# Patient Record
Sex: FEMALE | Race: WHITE | Employment: OTHER | URBAN - METROPOLITAN AREA
[De-identification: names, ages, dates, MRNs, and addresses within clinical notes are randomized per-mention and may not be internally consistent; named-entity substitution may affect disease eponyms.]

---

## 2017-09-26 ENCOUNTER — OFFICE VISIT (OUTPATIENT)
Dept: OBGYN CLINIC | Age: 22
End: 2017-09-26

## 2017-09-26 VITALS — WEIGHT: 285 LBS | BODY MASS INDEX: 45.31 KG/M2 | DIASTOLIC BLOOD PRESSURE: 86 MMHG | SYSTOLIC BLOOD PRESSURE: 128 MMHG

## 2017-09-26 DIAGNOSIS — Z01.419 ENCOUNTER FOR GYNECOLOGICAL EXAMINATION WITHOUT ABNORMAL FINDING: Primary | ICD-10-CM

## 2017-09-26 NOTE — MR AVS SNAPSHOT
Visit Information Date & Time Provider Department Dept. Phone Encounter #  
 8/27/2265  6:08 PM Abhijit Alcantara MD [address] [facility] [phone] [id number] Upcoming Health Maintenance Date Due  
 HPV AGE 9Y-34Y (1 of 3 - Female 3 Dose Series) 5/4/2005 PAP AKA CERVICAL CYTOLOGY 5/4/2015 INFLUENZA AGE 9 TO ADULT 8/1/2017 Allergies as of 9/26/2017  Review Complete On: 7/1/2013 By: Ayleen Loaiza RN Severity Noted Reaction Type Reactions Bactrim [Sulfamethoprim Ds]  07/01/2013    Rash Current Immunizations  Never Reviewed No immunizations on file. Not reviewed this visit You Were Diagnosed With   
  
 Codes Comments Encounter for gynecological examination without abnormal finding    -  Primary ICD-10-CM: O85.026 ICD-9-CM: V72.31 Vitals OB Status Smoking Status Having regular periods Never Smoker Your Updated Medication List  
  
   
This list is accurate as of: 9/26/17  2:09 PM.  Always use your most recent med list.  
  
  
  
  
 LEXAPRO 10 mg tablet Generic drug:  escitalopram oxalate Take 10 mg by mouth daily. norethindrone-ethinyl estradiol 1-35 mg-mcg Tab Commonly known as:  ORTHO-NOVUM 1-35 TAB, NORTREL 1-35 TAB Take 1 Tab by mouth daily. promethazine 25 mg tablet Commonly known as:  PHENERGAN Take 1 Tab by mouth every six (6) hours as needed for Nausea. Patient Instructions Pelvic Exam: Care Instructions Your Care Instructions When your doctor examines all of your pelvic organs, it's called a pelvic exam. Two good reasons to have this kind of exam are to check for sexually transmitted infections (STIs) and to get a Pap test. A Pap test is also called a Pap smear. It checks for early changes that can lead to cancer of the cervix. Sometimes a pelvic exam is part of a regular checkup.  In this case, you can do some things to make your test results as accurate as possible. · Try to schedule the exam when you don't have your period. · Don't use douches, tampons, or vaginal medicines, sprays, or powders for 24 hours before your exam. 
· Don't have sex for 24 hours before your exam. 
Other times, women have this kind of exam at any time of the month. This is because they have pelvic pain, bleeding, or discharge. Or they may have another pelvic problem. Before your exam, it's important to share some information with your doctor. For example, if you are a survivor of rape or sexual abuse, you can talk about any concerns you may have. Your doctor will also want to know if you are pregnant or use birth control. And he or she will want to hear about any problems, surgeries, or procedures you have had in your pelvic area. You will also need to tell your doctor when your last period was. Follow-up care is a key part of your treatment and safety. Be sure to make and go to all appointments, and call your doctor if you are having problems. It's also a good idea to know your test results and keep a list of the medicines you take. How is a pelvic exam done? · During a pelvic exam, you will: ¨ Take off your clothes below the waist. You will get a paper or cloth cover to put over the lower half of your body. [place] on your back on an exam table. Your feet will be raised above you. Stirrups will support your feet. · The doctor will: ¨ Ask you to relax your knees. Your knees need to lean out, toward the walls. ¨ Check the opening of your vagina for sores or swelling. ¨ Gently put a tool called a speculum into your vagina. It opens the vagina a little bit. You will feel some pressure. But if you are relaxed, it will not hurt. It lets your doctor see inside the vagina. ¨ Use a small brush, spatula, or swab to get a sample of cells, if you are having a Pap test or culture. The doctor then removes the speculum. ¨ Put on gloves and put one or two fingers of one hand into your vagina. The other hand goes on your lower belly. This lets your doctor feel your pelvic organs. You will probably feel some pressure. Try to stay relaxed. ¨ Put one gloved finger into your rectum and one into your vagina, if needed. This can also help check your pelvic organs. This exam takes about 10 minutes. At the end, you will get a washcloth or tissue to clean your vaginal area. It's normal to have some discharge after this exam. You can then get dressed. Some test results may be ready right away. But results from a culture or a Pap test may take several days or a few weeks. Why should you have a pelvic exam? 
· You want to have recommended screening tests. This includes a Pap test. 
· You think you have a vaginal infection. Signs include itching, burning, or unusual discharge. · You might have been exposed to a sexually transmitted infection (STI), such as chlamydia or herpes. · You have vaginal bleeding that is not part of your normal menstrual period. · You have pain in your belly or pelvis. · You have been sexually assaulted. A pelvic exam lets your doctor collect evidence and check for STIs. · You are pregnant. · You are having trouble getting pregnant. What are the risks of a pelvic exam? 
There are no risks from a pelvic exam. 
When should you call for help? Watch closely for changes in your health, and be sure to contact your doctor if: 
· You have heavy bleeding or discharge from your vagina after the exam. 
Where can you learn more? Go to http://emory-hillary.info/. Enter Q514 in the search box to learn more about \"Pelvic Exam: Care Instructions. \" Current as of: October 13, 2016 Content Version: 11.3 © 8443-8220 [employer].  Care instructions adapted under license by [employer] (which disclaims liability or warranty for this information). If you have questions about a medical condition or this instruction, always ask your healthcare professional. [address] any warranty or liability for your use of this information. Introducing [facility] SERVICES! Jolene Hooker introduces [facility] patient portal. Now you can access parts of your medical record, email your doctor's office, and request medication refills online. 1. In your internet browser, go to [email or web address] 2. Click on the First Time User? Click Here link in the Sign In box. You will see the New Member Sign Up page. 3. Enter your [facility] Access Code exactly as it appears below. You will not need to use this code after youve completed the sign-up process. If you do not sign up before the expiration date, you must request a new code. · [facility] Access Code: [email or web address] Expires: 12/25/2017  2:09 PM 
 
4. Enter the last four digits of your Social Security Number (xxxx) and Date of Birth (mm/dd/yyyy) as indicated and click Submit. You will be taken to the next sign-up page. 5. Create a [facility] ID. This will be your [facility] login ID and cannot be changed, so think of one that is secure and easy to remember. 6. Create a [facility] password. You can change your password at any time. 7. Enter your Password Reset Question and Answer. This can be used at a later time if you forget your password. 8. Enter your e-mail address. You will receive e-mail notification when new information is available in [address]. 9. Click Sign Up. You can now view and download portions of your medical record. 10. Click the Download Summary menu link to download a portable copy of your medical information. If you have questions, please visit the Frequently Asked Questions section of the [facility] website. Remember, [facility] is NOT to be used for urgent needs. For medical emergencies, dial 911. Now available from your iPhone and Android! Please provide this summary of care documentation to your next provider. Your primary care clinician is listed as NOT ON FILE. If you have any questions after today's visit, please call [phone].

## 2017-09-26 NOTE — PROGRESS NOTES
Annual exam ages 21-44    Moody Curiel is a No obstetric history on file. ,  20 y.o. female [facility] No LMP recorded. She presents for her annual checkup. Patient would like to discuss going back on birth control. Patient states she has been off birth control for a while and cycles have become more irregular in the last few months. Seen by me years ago due to concerns w ruptured ovarian cyst and irregular cycles  She is having no significant problems. 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 Menstrual status:    Her periods are moderate in flow. She is using one to two pads or tampons per day, irregular cycles. She denies dysmenorrhea. She reports no premenstrual symptoms. Contraception:    The current method of family planning is abstinence. Sexual history:     She  has no sexual activity history on file. .    Medical conditions:    Since her last annual GYN exam about four or more years ago, she has not the following changes in her health history: none. Pap and Mammogram History:    Patient hasn't had a pap smear. Breast Cancer History/Substance Abuse: negative    Past Medical History:   Diagnosis Date    Anxiety     Weight disorder      No past surgical history on file. Current Outpatient Prescriptions   Medication Sig Dispense Refill    escitalopram (LEXAPRO) 10 mg tablet Take 10 mg by mouth daily.  norethindrone-ethinyl estradiol (ORTHO-NOVUM 1-35 TAB, NORTREL 1-35 TAB) 1-35 mg-mcg per tablet Take 1 Tab by mouth daily.  promethazine (PHENERGAN) 25 mg tablet Take 1 Tab by mouth every six (6) hours as needed for Nausea. 20 Tab 0     Allergies: Bactrim [sulfamethoprim ds]     Tobacco History:  reports that she has never smoked.  She does not have any smokeless tobacco history on file. Alcohol Abuse:  reports that she does not drink alcohol. Drug Abuse:  reports that she does not use illicit drugs. Family Medical/Cancer History: No family history on file. Review of Systems - History obtained from the patient  Constitutional: negative for weight loss, fever, night sweats  HEENT: negative for hearing loss, earache, congestion, snoring, sorethroat  CV: negative for chest pain, palpitations, edema  Resp: negative for cough, shortness of breath, wheezing  GI: negative for change in bowel habits, abdominal pain, black or bloody stools  : negative for frequency, dysuria, hematuria, vaginal discharge  MSK: negative for back pain, joint pain, muscle pain  Breast: negative for breast lumps, nipple discharge, galactorrhea  Skin :negative for itching, rash, hives  Neuro: negative for dizziness, headache, confusion, weakness  Psych: negative for anxiety, depression, change in mood  Heme/lymph: negative for bleeding, bruising, pallor    Physical Exam    There were no vitals taken for this visit. Constitutional  · Appearance: well-nourished, well developed, alert, in no acute distress    HENT  · Head and Face: appears normal    Chest  · Respiratory Effort: breathing unlabored    Gastrointestinal  · Abdominal Examination: abdomen non-tender to palpation, normal bowel sounds, no masses present  · Liver and spleen: no hepatomegaly present, spleen not palpable  · Hernias: no hernias identified    Genitourinary  · deferred    Skin  · General Inspection: no rash, no lesions identified    Neurologic/Psychiatric  · Mental Status:  · Orientation: grossly oriented to person, place and time  · Mood and Affect: mood normal, affect appropriate    . Assessment:  Routine gynecologic examination  Her current medical status is satisfactory with no evidence of significant gynecologic issues.   Desires restart of ocp  Declines STD screening  Plan:  Counseled re: diet, exercise, healthy lifestyle; safe sex practices reviewed  Return for yearly wellness visit  Script for ocp shared with appropriate instructions

## 2017-09-26 NOTE — PATIENT INSTRUCTIONS
Pelvic Exam: Care Instructions  Your Care Instructions    When your doctor examines all of your pelvic organs, it's called a pelvic exam. Two good reasons to have this kind of exam are to check for sexually transmitted infections (STIs) and to get a Pap test. A Pap test is also called a Pap smear. It checks for early changes that can lead to cancer of the cervix. Sometimes a pelvic exam is part of a regular checkup. In this case, you can do some things to make your test results as accurate as possible. · Try to schedule the exam when you don't have your period. · Don't use douches, tampons, or vaginal medicines, sprays, or powders for 24 hours before your exam.  · Don't have sex for 24 hours before your exam.  Other times, women have this kind of exam at any time of the month. This is because they have pelvic pain, bleeding, or discharge. Or they may have another pelvic problem. Before your exam, it's important to share some information with your doctor. For example, if you are a survivor of rape or sexual abuse, you can talk about any concerns you may have. Your doctor will also want to know if you are pregnant or use birth control. And he or she will want to hear about any problems, surgeries, or procedures you have had in your pelvic area. You will also need to tell your doctor when your last period was. Follow-up care is a key part of your treatment and safety. Be sure to make and go to all appointments, and call your doctor if you are having problems. It's also a good idea to know your test results and keep a list of the medicines you take. How is a pelvic exam done? · During a pelvic exam, you will:  ¨ Take off your clothes below the waist. You will get a paper or cloth cover to put over the lower half of your body. Ger[place] Corners on your back on an exam table. Your feet will be raised above you. Stirrups will support your feet. · The doctor will:  Abel Schmittl you to relax your knees.  Your knees need to lean out, toward the walls. ¨ Check the opening of your vagina for sores or swelling. ¨ Gently put a tool called a speculum into your vagina. It opens the vagina a little bit. You will feel some pressure. But if you are relaxed, it will not hurt. It lets your doctor see inside the vagina. ¨ Use a small brush, spatula, or swab to get a sample of cells, if you are having a Pap test or culture. The doctor then removes the speculum. ¨ Put on gloves and put one or two fingers of one hand into your vagina. The other hand goes on your lower belly. This lets your doctor feel your pelvic organs. You will probably feel some pressure. Try to stay relaxed. ¨ Put one gloved finger into your rectum and one into your vagina, if needed. This can also help check your pelvic organs. This exam takes about 10 minutes. At the end, you will get a washcloth or tissue to clean your vaginal area. It's normal to have some discharge after this exam. You can then get dressed. Some test results may be ready right away. But results from a culture or a Pap test may take several days or a few weeks. Why should you have a pelvic exam?  · You want to have recommended screening tests. This includes a Pap test.  · You think you have a vaginal infection. Signs include itching, burning, or unusual discharge. · You might have been exposed to a sexually transmitted infection (STI), such as chlamydia or herpes. · You have vaginal bleeding that is not part of your normal menstrual period. · You have pain in your belly or pelvis. · You have been sexually assaulted. A pelvic exam lets your doctor collect evidence and check for STIs. · You are pregnant. · You are having trouble getting pregnant. What are the risks of a pelvic exam?  There are no risks from a pelvic exam.  When should you call for help?   Watch closely for changes in your health, and be sure to contact your doctor if:  · You have heavy bleeding or discharge from your vagina after the exam.  Where can you learn more? Go to http://emory-hillary.info/. Enter Y536 in the search box to learn more about \"Pelvic Exam: Care Instructions. \"  Current as of: October 13, 2016  Content Version: 11.3  © 4128-9963 [employer], [employer]. Care instructions adapted under license by [employer] (which disclaims liability or warranty for this information). If you have questions about a medical condition or this instruction, always ask your healthcare professional. [address] any warranty or liability for your use of this information.

## 2018-07-23 NOTE — TELEPHONE ENCOUNTER
Patient called stating that she is out of her birth control pills and wants more before her annual.   She should have had 3 more refills at her [address]. They state she should have but that she has gotten it filled twice a month in 3 different months.   Did you want this refilled or did you want to talk to the patient prior to?

## 2018-07-23 NOTE — TELEPHONE ENCOUNTER
Prescription sent for patient's birth control to preferred pharmacy per Dr. Slime lemus to refill to get patient to annual exam.

## 2018-09-27 ENCOUNTER — OFFICE VISIT (OUTPATIENT)
Dept: OBGYN CLINIC | Age: 23
End: 2018-09-27

## 2018-09-27 VITALS
WEIGHT: 293 LBS | BODY MASS INDEX: 45.99 KG/M2 | SYSTOLIC BLOOD PRESSURE: 134 MMHG | HEIGHT: 67 IN | DIASTOLIC BLOOD PRESSURE: 80 MMHG

## 2018-09-27 DIAGNOSIS — Z01.419 WELL WOMAN EXAM WITH ROUTINE GYNECOLOGICAL EXAM: Primary | ICD-10-CM

## 2018-09-27 PROBLEM — E66.01 OBESITY, MORBID (HCC): Status: ACTIVE | Noted: 2018-09-27

## 2018-09-27 NOTE — PROGRESS NOTES
Annual exam ages 21-44    Doug Baker is a No obstetric history on file. ,  24 y.o. female [facility] Patient's last menstrual period was 09/09/2018. .    She presents for her annual checkup. She is having no significant problems. Last two menstrual cycles had worse cramps and heavier bleeding. Recent stress but fun; newly   40lb weight gain since last year; tearful sharing that weight swings are frustrating \"I;ve tried everything\"    With regard to the Gardasil vaccine, she has received all 3 injections. Menstrual status:    Her periods are moderate, heavy in flow. She is using three to five pads or tampons per day, usually regular and last 26-30 days. She denies dysmenorrhea. She reports no premenstrual symptoms. Contraception:    The current method of family planning is OCP (estrogen/progesterone). Sexual history:     She  reports that she currently engages in sexual activity and has had male partners. She reports using the following method of birth control/protection: Pill. .    Medical conditions:    Since her last annual GYN exam about one year ago, she has not the following changes in her health history: none. Pap and Mammogram History:    Patient has never had a pap smear. Breast Cancer History/Substance Abuse: negative    Past Medical History:   Diagnosis Date    Anxiety     Ovarian cyst     Weight disorder      History reviewed. No pertinent surgical history. Current Outpatient Prescriptions   Medication Sig Dispense Refill    norethindrone-ethinyl estradiol (NECON 0.5/35, 28,) 0.5-35 mg-mcg tab Take 1 Tab by mouth daily. 1 Package 2    VENLAFAXINE HCL (EFFEXOR PO) Take  by mouth. Allergies: Bactrim [sulfamethoprim ds] and Sulfa (sulfonamide antibiotics)     Tobacco History:  reports that she has never smoked. She has never used smokeless tobacco.  Alcohol Abuse:  reports that she drinks alcohol.   Drug Abuse:  reports that she does not use illicit drugs.     Family Medical/Cancer History:   Family History   Problem Relation Age of Onset    Hypertension Father     Prostate Cancer Maternal Grandfather     Diabetes Paternal Grandmother     Other Paternal Grandfather      throat cancer        Review of Systems - History obtained from the patient  Constitutional: negative for weight loss, fever, night sweats  HEENT: negative for hearing loss, earache, congestion, snoring, sorethroat  CV: negative for chest pain, palpitations, edema  Resp: negative for cough, shortness of breath, wheezing  GI: negative for change in bowel habits, abdominal pain, black or bloody stools  : negative for frequency, dysuria, hematuria, vaginal discharge  MSK: negative for back pain, joint pain, muscle pain  Breast: negative for breast lumps, nipple discharge, galactorrhea  Skin :negative for itching, rash, hives  Neuro: negative for dizziness, headache, confusion, weakness  Psych: negative for anxiety, depression, change in mood  Heme/lymph: negative for bleeding, bruising, pallor    Physical Exam    Visit Vitals    Ht 5' 6.5\" (1.689 m)    Wt 322 lb 12.8 oz (146.4 kg)    LMP 09/09/2018    BMI 51.32 kg/m2       Constitutional  · Appearance: well-nourished, well developed, alert, in no acute distress    HENT  · Head and Face: appears normal    Chest  · Respiratory Effort: breathing unlabored    Breasts  · Inspection of Breasts: breasts symmetrical, no skin changes, no discharge present, nipple appearance normal, no skin retraction present  · Palpation of Breasts and Axillae: no masses present on palpation, no breast tenderness  · Axillary Lymph Nodes: no lymphadenopathy present    Gastrointestinal  · Abdominal Examination: abdomen non-tender to palpation, normal bowel sounds, no masses present  · Liver and spleen: no hepatomegaly present, spleen not palpable  · Hernias: no hernias identified    Genitourinary  · External Genitalia: normal appearance for age, no discharge present, no tenderness present, no inflammatory lesions present, no masses present, no atrophy present  · Vagina: normal vaginal vault without central or paravaginal defects, no discharge present, no inflammatory lesions present, no masses present  · Bladder: non-tender to palpation  · Urethra: appears normal  · Cervix: normal   · Uterus: normal size, shape and consistency  · Adnexa: no adnexal tenderness present, no adnexal masses present  · Perineum: perineum within normal limits, no evidence of trauma, no rashes or skin lesions present  · Anus: anus within normal limits, no hemorrhoids present  · Inguinal Lymph Nodes: no lymphadenopathy present    Skin  · General Inspection: no rash, no lesions identified    Neurologic/Psychiatric  · Mental Status:  · Orientation: grossly oriented to person, place and time  · Mood and Affect: mood normal, affect appropriate    . Assessment:  Routine gynecologic examination  Heavier menses possibly assoc w recent weigth gain  Her current medical status is satisfactory with no evidence of significant gynecologic issues.     Plan:  Reviewed challenges of weight gain; offered [facility]  ocp script renewed  GC/ chlamydia offered and declined  Counseled re: diet, exercise, healthy lifestyle  Return for yearly wellness visits  Gardisil counseling provided  Rec screening mammo at either 34 or 39

## 2018-09-28 LAB
CYTOLOGIST CVX/VAG CYTO: NORMAL
CYTOLOGY CVX/VAG DOC THIN PREP: NORMAL
DX ICD CODE: NORMAL
LABCORP, 190119: NORMAL
Lab: NORMAL
OTHER STN SPEC: NORMAL
PATH REPORT.FINAL DX SPEC: NORMAL
STAT OF ADQ CVX/VAG CYTO-IMP: NORMAL

## 2019-06-06 ENCOUNTER — TELEPHONE (OUTPATIENT)
Dept: OBGYN CLINIC | Age: 24
End: 2019-06-06

## 2019-06-06 NOTE — TELEPHONE ENCOUNTER
Patient of [address] [place] [address]. She is a 21 y.o female that said that she spoke with the doctor on call last night and was advised to see [address] [place] [address] today. She is complaining of pain in right side that she said started 3 days ago. Now has vaginal bleeding that is a manageable medium in flow and dark in color. No clots. She said that she is not due her cycle, she is on the second week of taking her OCP. She has a history of cysts on her ovaries and pelvic floor injury. Patient is aware that  is out of office this am.  There are no appointments for [address] [place] [address] nor is there a US appt. She has been advised that we are not equipped to deal with acute pain but chronic issues. She has asked for this message to go to [address] [place] [address] to see if she may be worked in but may also need [address] as opposed to going to ER.

## 2019-06-06 NOTE — TELEPHONE ENCOUNTER
Dr. Freedom Baker said if she has no nausea, vomiting, or severe pain then she typically allows people to monitor discomfort for about 3 weeks due to likely changes on the ovaries that can resolve on their own. However, she said if pain significantly worsens, we will need to bring her in for an ultrasound and visit. She advised patient to absolutely continue her OCP as well.

## 2020-10-14 LAB
ANTIBODY SCREEN, EXTERNAL: NEGATIVE
CHLAMYDIA, EXTERNAL: NEGATIVE
HBSAG, EXTERNAL: NEGATIVE
HIV, EXTERNAL: NORMAL
N. GONORRHEA, EXTERNAL: NEGATIVE
RPR, EXTERNAL: NORMAL
RUBELLA, EXTERNAL: NORMAL
TYPE, ABO & RH, EXTERNAL: NORMAL

## 2020-11-10 LAB — GTT, 1 HR, GLUCOLA, EXTERNAL: 96

## 2021-04-26 LAB — GRBS, EXTERNAL: NEGATIVE

## 2021-05-06 ENCOUNTER — HOSPITAL ENCOUNTER (INPATIENT)
Age: 26
LOS: 4 days | Discharge: HOME | DRG: 773 | End: 2021-05-10
Attending: OBSTETRICS & GYNECOLOGY | Admitting: OBSTETRICS & GYNECOLOGY
Payer: OTHER GOVERNMENT

## 2021-05-06 DIAGNOSIS — Z98.891 S/P C-SECTION: Primary | ICD-10-CM

## 2021-05-06 PROBLEM — Z34.90 TERM PREGNANCY: Status: ACTIVE | Noted: 2021-05-06

## 2021-05-06 LAB
ALBUMIN SERPL-MCNC: 2.8 G/DL (ref 3.5–5)
ALBUMIN/GLOB SERPL: 0.8 {RATIO} (ref 1.1–2.2)
ALP SERPL-CCNC: 71 U/L (ref 45–117)
ALT SERPL-CCNC: 14 U/L (ref 12–78)
ANION GAP SERPL CALC-SCNC: 7 MMOL/L (ref 5–15)
AST SERPL-CCNC: 11 U/L (ref 15–37)
BILIRUB SERPL-MCNC: 0.1 MG/DL (ref 0.2–1)
BUN SERPL-MCNC: 8 MG/DL (ref 6–20)
BUN/CREAT SERPL: 16 (ref 12–20)
CALCIUM SERPL-MCNC: 8.2 MG/DL (ref 8.5–10.1)
CHLORIDE SERPL-SCNC: 109 MMOL/L (ref 97–108)
CO2 SERPL-SCNC: 23 MMOL/L (ref 21–32)
COVID-19 RAPID TEST, COVR: NOT DETECTED
CREAT SERPL-MCNC: 0.5 MG/DL (ref 0.55–1.02)
CREAT UR-MCNC: 118 MG/DL
ERYTHROCYTE [DISTWIDTH] IN BLOOD BY AUTOMATED COUNT: 13.2 % (ref 11.5–14.5)
GLOBULIN SER CALC-MCNC: 3.3 G/DL (ref 2–4)
GLUCOSE SERPL-MCNC: 92 MG/DL (ref 65–100)
HCT VFR BLD AUTO: 35.8 % (ref 35–47)
HGB BLD-MCNC: 11.6 G/DL (ref 11.5–16)
MCH RBC QN AUTO: 29.4 PG (ref 26–34)
MCHC RBC AUTO-ENTMCNC: 32.4 G/DL (ref 30–36.5)
MCV RBC AUTO: 90.6 FL (ref 80–99)
NRBC # BLD: 0 K/UL (ref 0–0.01)
NRBC BLD-RTO: 0 PER 100 WBC
PLATELET # BLD AUTO: 298 K/UL (ref 150–400)
PMV BLD AUTO: 10.6 FL (ref 8.9–12.9)
POTASSIUM SERPL-SCNC: 4 MMOL/L (ref 3.5–5.1)
PROT SERPL-MCNC: 6.1 G/DL (ref 6.4–8.2)
PROT UR-MCNC: 19 MG/DL (ref 0–11.9)
PROT/CREAT UR-RTO: 0.2
RBC # BLD AUTO: 3.95 M/UL (ref 3.8–5.2)
SARS-COV-2, COV2: NORMAL
SODIUM SERPL-SCNC: 139 MMOL/L (ref 136–145)
SOURCE, COVRS: NORMAL
WBC # BLD AUTO: 14.4 K/UL (ref 3.6–11)

## 2021-05-06 PROCEDURE — 75410000002 HC LABOR FEE PER 1 HR: Performed by: OBSTETRICS & GYNECOLOGY

## 2021-05-06 PROCEDURE — 36415 COLL VENOUS BLD VENIPUNCTURE: CPT

## 2021-05-06 PROCEDURE — 74011250637 HC RX REV CODE- 250/637: Performed by: OBSTETRICS & GYNECOLOGY

## 2021-05-06 PROCEDURE — 59200 INSERT CERVICAL DILATOR: CPT | Performed by: OBSTETRICS & GYNECOLOGY

## 2021-05-06 PROCEDURE — 65270000029 HC RM PRIVATE

## 2021-05-06 PROCEDURE — 87635 SARS-COV-2 COVID-19 AMP PRB: CPT

## 2021-05-06 PROCEDURE — 85027 COMPLETE CBC AUTOMATED: CPT

## 2021-05-06 PROCEDURE — 80053 COMPREHEN METABOLIC PANEL: CPT

## 2021-05-06 PROCEDURE — 82570 ASSAY OF URINE CREATININE: CPT

## 2021-05-06 RX ORDER — ACETAMINOPHEN 325 MG/1
650 TABLET ORAL
Status: DISCONTINUED | OUTPATIENT
Start: 2021-05-06 | End: 2021-05-08

## 2021-05-06 RX ORDER — GUAIFENESIN 100 MG/5ML
81 LIQUID (ML) ORAL DAILY
COMMUNITY
End: 2021-05-10

## 2021-05-06 RX ORDER — OXYTOCIN/RINGER'S LACTATE 30/500 ML
0-20 PLASTIC BAG, INJECTION (ML) INTRAVENOUS
Status: DISCONTINUED | OUTPATIENT
Start: 2021-05-07 | End: 2021-05-08

## 2021-05-06 RX ORDER — MAG HYDROX/ALUMINUM HYD/SIMETH 200-200-20
30 SUSPENSION, ORAL (FINAL DOSE FORM) ORAL
Status: DISCONTINUED | OUTPATIENT
Start: 2021-05-06 | End: 2021-05-08 | Stop reason: HOSPADM

## 2021-05-06 RX ORDER — DIPHENHYDRAMINE HCL 25 MG
50 CAPSULE ORAL ONCE
Status: COMPLETED | OUTPATIENT
Start: 2021-05-06 | End: 2021-05-06

## 2021-05-06 RX ORDER — SODIUM CHLORIDE, SODIUM LACTATE, POTASSIUM CHLORIDE, CALCIUM CHLORIDE 600; 310; 30; 20 MG/100ML; MG/100ML; MG/100ML; MG/100ML
125 INJECTION, SOLUTION INTRAVENOUS CONTINUOUS
Status: DISCONTINUED | OUTPATIENT
Start: 2021-05-07 | End: 2021-05-08

## 2021-05-06 RX ORDER — SODIUM CHLORIDE 0.9 % (FLUSH) 0.9 %
5-40 SYRINGE (ML) INJECTION EVERY 8 HOURS
Status: CANCELLED | OUTPATIENT
Start: 2021-05-06

## 2021-05-06 RX ADMIN — DIPHENHYDRAMINE HYDROCHLORIDE 50 MG: 25 CAPSULE ORAL at 22:44

## 2021-05-06 RX ADMIN — ACETAMINOPHEN 650 MG: 325 TABLET ORAL at 22:44

## 2021-05-06 NOTE — H&P
Obstetrics Admission History & Physical    Name: Sheryle Berke MRN: [id number]  SSN: [id number]    YOB: 1995  Age: 31 y.o. Sex: female      Subjective:     Reason for Admission:  Pregnancy and PIH    History of Present Illness: Sheryle Berke is a 31 y.o.  female with an estimated gestational age of 36w7d with Estimated Date of Delivery: 21. Patient presents for cervical ripening and induction of labor due to preeclampsia without severe features. Pregnancy has been complicated by maternal obesity and preeclampsia without severe features dx'd at 32 weeks due to elevated BP and P/C ratio at that time of 0.43. Hazel Libman Patient denies abdominal pain  , headache , right upper quadrant pain  , shortness of breath, vaginal bleeding , vaginal leaking of fluid  and visual disturbances. OB History        1    Para   0    Term                AB        Living           SAB        TAB        Ectopic        Molar        Multiple        Live Births                  Past Medical History:   Diagnosis Date    Anxiety     Essential hypertension     elevated    Gestational hypertension     Ovarian cyst     Psychiatric problem     anxiety, depression    Weight disorder      Past Surgical History:   Procedure Laterality Date    HX OTHER SURGICAL      wisdom teeth     Social History     Socioeconomic History    Marital status: SINGLE     Spouse name: Not on file    Number of children: Not on file    Years of education: Not on file    Highest education level: Not on file   Occupational History    Not on file   Social Needs    Financial resource strain: Not on file    Food insecurity     Worry: Not on file     Inability: Not on file    Transportation needs     Medical: Not on file     Non-medical: Not on file   Tobacco Use    Smoking status: Never Smoker    Smokeless tobacco: Never Used   Substance and Sexual Activity    Alcohol use:  Yes    Drug use: No    Sexual activity: Yes Partners: Male     Birth control/protection: Pill   Lifestyle    Physical activity     Days per week: Not on file     Minutes per session: Not on file    Stress: Not on file   Relationships    Social connections     Talks on phone: Not on file     Gets together: Not on file     Attends Anabaptist service: Not on file     Active member of club or organization: Not on file     Attends meetings of clubs or organizations: Not on file     Relationship status: Not on file    Intimate partner violence     Fear of current or ex partner: Not on file     Emotionally abused: Not on file     Physically abused: Not on file     Forced sexual activity: Not on file   Other Topics Concern    Not on file   Social History Narrative    Not on file     Family History   Problem Relation Age of Onset    Hypertension Father     Prostate Cancer Maternal Grandfather     Diabetes Paternal Grandmother     Other Paternal Grandfather         throat cancer     Allergies   Allergen Reactions    Bactrim [Sulfamethoprim Ds] Rash    Penicillins Swelling    Sulfa (Sulfonamide Antibiotics) Rash     Prior to Admission medications    Medication Sig Start Date End Date Taking? Authorizing Provider   norethindrone-ethinyl estradiol (NECON 0.5/35, 28,) 0.5-35 mg-mcg tab Take 1 Tab by mouth daily. 8/00/24   Gracie Diaz MD   VENLAFAXINE HCL (EFFEXOR PO) Take  by mouth. Provider, Historical        Review of Systems:  A comprehensive review of systems was negative except for that written in the History of Present Illness. Objective:     Vitals:  Height 5' 6\" (1.676 m), weight 150.6 kg (332 lb). No data recorded. No data recorded     Physical Exam:  Patient without distress.   Heart: Regular rate and rhythm  Lung: normal respiratory effort  Abdomen: soft, nontender  Fundus: soft and non tender  Perineum: blood absent, amniotic fluid absent  Cervical Exam: 30%/ftp/-4  Lower Extremities:  - Edema 2+       Membranes:  Intact    Uterine Activity:  None    Fetal Heart Rate:  Reactive       Labs:   Recent Results (from the past 24 hour(s))   CBC W/O DIFF    Collection Time: 05/06/21  5:07 PM   Result Value Ref Range    WBC 14.4 (H) 3.6 - 11.0 K/uL    RBC 3.95 3.80 - 5.20 M/uL    HGB 11.6 11.5 - 16.0 g/dL    HCT 35.8 35.0 - 47.0 %    MCV 90.6 80.0 - 99.0 FL    MCH 29.4 26.0 - 34.0 PG    MCHC 32.4 30.0 - 36.5 g/dL    RDW 13.2 11.5 - 14.5 %    PLATELET 291 322 - 382 K/uL    MPV 10.6 8.9 - 12.9 FL    NRBC 0.0 0  WBC    ABSOLUTE NRBC 0.00 0.00 - 0.01 K/uL       Assessment and Plan:     - Preeclampsia:  mild  Admit for cervical ripening and induction of labor.     Signed By:  Sawyer Pearce MD     May 6, 2021

## 2021-05-06 NOTE — PROGRESS NOTES
After explaining indication for Melvenia Railing catheter and the placement procedure, and receiving patient's consent, she was placed in lithotomy position on the labor bed. Sterile speculum inserted and cervix clearly visualized. Betadine swabs were used to prep cervix and then Melvenia Railing catheter was inserted under direct visualization without difficulty using sterile technique. Proximal and distal balloons were both filled with 60cc sterile saline. Patient tolerated placement well.

## 2021-05-06 NOTE — PROGRESS NOTES
1700: Pt and FOB arrived on unit for scheduled induction for pre-e. Pt escorted to rm 204. Pt oriented to rm and unit. 1705: Pt changed into gown, voided. Pt placed on EFM/TOCO. FHR difficult to monitor d/t maternal habitus. 1735: Dr. Pedro Monahan at bedside assessing pt and reviewing POC with pt. Pt stated that she was FT in office. 1742: Pt in foot pedals, bed broken down. Dr. Pedro Monahna placing cook cath at this time. 60cc/60cc in cook cath. Pt tolerated well. MD would like pt to remain NPO 1hr after cook cath placed. 1800: EFM adjusted continuously with this RN at bedside. Difficult to trace FHR d/t maternal habitus. 1820: Dr. Pedro Monahan checking on pt. Pt state cramping but it is tolerable at this time. Per MD- at 0530, tug on cook cath to see if lose. Then start pitocin at 0600.     1915: Bedside and Verbal shift change report given to LUCILLE Kirkpatrick RN (oncoming nurse) by Anton Turner RN (offgoing nurse). Report included the following information SBAR, Kardex, Procedure Summary, Intake/Output, MAR, Recent Results, Med Rec Status and Quality Measures.

## 2021-05-07 PROCEDURE — 74011250636 HC RX REV CODE- 250/636: Performed by: OBSTETRICS & GYNECOLOGY

## 2021-05-07 PROCEDURE — 75410000002 HC LABOR FEE PER 1 HR: Performed by: OBSTETRICS & GYNECOLOGY

## 2021-05-07 PROCEDURE — 65270000029 HC RM PRIVATE

## 2021-05-07 PROCEDURE — 74011250637 HC RX REV CODE- 250/637: Performed by: OBSTETRICS & GYNECOLOGY

## 2021-05-07 RX ORDER — SODIUM CHLORIDE 0.9 % (FLUSH) 0.9 %
5-40 SYRINGE (ML) INJECTION AS NEEDED
Status: DISCONTINUED | OUTPATIENT
Start: 2021-05-07 | End: 2021-05-08

## 2021-05-07 RX ORDER — NALBUPHINE HYDROCHLORIDE 10 MG/ML
10 INJECTION, SOLUTION INTRAMUSCULAR; INTRAVENOUS; SUBCUTANEOUS
Status: DISCONTINUED | OUTPATIENT
Start: 2021-05-07 | End: 2021-05-07

## 2021-05-07 RX ORDER — OXYTOCIN/RINGER'S LACTATE 30/500 ML
87.3 PLASTIC BAG, INJECTION (ML) INTRAVENOUS AS NEEDED
Status: DISCONTINUED | OUTPATIENT
Start: 2021-05-07 | End: 2021-05-08

## 2021-05-07 RX ORDER — NALBUPHINE HYDROCHLORIDE 10 MG/ML
10 INJECTION, SOLUTION INTRAMUSCULAR; INTRAVENOUS; SUBCUTANEOUS
Status: DISCONTINUED | OUTPATIENT
Start: 2021-05-08 | End: 2021-05-08

## 2021-05-07 RX ORDER — OXYTOCIN/RINGER'S LACTATE 30/500 ML
10 PLASTIC BAG, INJECTION (ML) INTRAVENOUS AS NEEDED
Status: DISCONTINUED | OUTPATIENT
Start: 2021-05-07 | End: 2021-05-08

## 2021-05-07 RX ADMIN — OXYTOCIN 2 MILLI-UNITS/MIN: 10 INJECTION, SOLUTION INTRAMUSCULAR; INTRAVENOUS at 06:15

## 2021-05-07 RX ADMIN — NALBUPHINE HYDROCHLORIDE 10 MG: 10 INJECTION, SOLUTION INTRAMUSCULAR; INTRAVENOUS; SUBCUTANEOUS at 23:37

## 2021-05-07 RX ADMIN — ACETAMINOPHEN 650 MG: 325 TABLET ORAL at 13:01

## 2021-05-07 RX ADMIN — SODIUM CHLORIDE, POTASSIUM CHLORIDE, SODIUM LACTATE AND CALCIUM CHLORIDE 125 ML/HR: 600; 310; 30; 20 INJECTION, SOLUTION INTRAVENOUS at 21:59

## 2021-05-07 RX ADMIN — PROMETHAZINE HYDROCHLORIDE 25 MG: 25 INJECTION INTRAMUSCULAR; INTRAVENOUS at 20:30

## 2021-05-07 RX ADMIN — SODIUM CHLORIDE, POTASSIUM CHLORIDE, SODIUM LACTATE AND CALCIUM CHLORIDE 125 ML/HR: 600; 310; 30; 20 INJECTION, SOLUTION INTRAVENOUS at 06:15

## 2021-05-07 RX ADMIN — NALBUPHINE HYDROCHLORIDE 10 MG: 10 INJECTION, SOLUTION INTRAMUSCULAR; INTRAVENOUS; SUBCUTANEOUS at 20:27

## 2021-05-07 NOTE — PROGRESS NOTES
Labor Progress Note    Patient seen, fetal heart rate and contraction pattern evaluated, patient examined. AROM    Physical Exam:  Cervical Exam:  3 cm dilated    70% effaced    -3 station    Presenting Part: cephalic  Cervical Position: mid position  Consistency: Soft  Uterine Activity: Frequency: Every 4 minutes  Pit 12  AROM copious clear fluid  Fetal Heart Rate: Reactive  Baseline: 150 per minute  Variability: moderate  Accelerations: no  Decelerations: none    Assessment/Plan:  Reassuring fetal status, Continue plan for vaginal delivery   AROM.  Continue Pitocin  Cat 1 FHT        Tereso Chau MD

## 2021-05-07 NOTE — PROGRESS NOTES
0700 Bedside and Verbal shift change report given to Beth Berger RN (oncoming nurse) by MARCELA Ricardo RN (offgoing nurse). Report included the following information SBAR, Kardex, Intake/Output, MAR and Recent Results. 9879 RN bedside. Patient states she is feeling no CTX    0734 RN bedside, patient sitting up with clear liquid tray. RN educates patient to call when she is finished, patient verbalized understanding. 0001 Patient ambulatory to the bathroom, gar bulb falls out while patient up.     0750 Dr. Davi Stone bedside SVE 3/30/-3.    3027 MD remains bedside US confirms vertex presentation. Ankita Garland 35 adjusted. 300 [facility] adjusted. Isela [facility] 336 adjusted. [phone] Patient up to the bathroom. 1301 Tylenol administered for HA.     1345 RN bedside patient states her HA is now gone. [phone] Patient ambulatory to the bathroom, able to void without difficulty. 1515 Patient ambulatory to the bathroom able to void no issues. 272 [facility] Dr. Davi Stone bedside AROM of clear fluid 3/30/-3.    1520 EFM re-applied by this RN.     334 [facility] adjusted. 1656 EFM applied by this RN for one hour of monitoring. 6638-3956 Patient ambulatory to the bathroom, able to void no difficulty. 1712 Patient sitting up on the side of the bed for her clear liquid dinner tray. 1847 US adjusted. 1856 Bedside and Written shift change report given to Senait Silva RN (oncoming nurse) by Beth Berger RN (offgoing nurse). Report included the following information SBAR, Kardex, Procedure Summary, Intake/Output, MAR and Recent Results.

## 2021-05-07 NOTE — PROGRESS NOTES
I have received SBAR from Dr. Hakan Rubio, have assumed care of the patient, and have introduced myself to the patient and her spouse. The patient complains of menstrual type cramping on her right lower abdomen and is otherwise comfortable. Denies headache, vision changes, nausea, vomiting, ruq pain, and epigastric pain. Patient Vitals for the past 4 hrs:   Temp Pulse Resp BP   21 1818 98.6 °F (37 °C)      21 1638  68  136/80   21 1538  82  125/71   21 1524 98.2 °F (36.8 °C) 80 16 110/74       Abdomen: obese, soft, gravid, non tender, leopolds limited in light of mother's habitus, EFW 7#  SVE: deferred  Ext: trace edema, dtr 2+    Ass/Plan:  at 37 wks IOL for preeclampsia; obesity BMI 53; currently desires to labor without pain medication    S/p cook catheter; s/p amniotomy at 1500. Continue to titrate pitocin. The patient inquired about her plan of care. She was informed that her pitocin would be increased prn for adequate contractions and that her cervix would be checked later this evening. Also made aware that labor induction can be a process that can take 2-3 days. Informed about the use of IUPC to monitor contraction intensity and that this may be considered later during the course of induction. Also informed that a pitocin break could be considered if needed.

## 2021-05-07 NOTE — PROGRESS NOTES
Labor Progress Note    Patient seen, fetal heart rate and contraction pattern evaluated, patient examined. Discussed IOL plan with patient and her partner. Ultrasound was done to confirm vertex position. Nickerson bulb had come out in the bathroom. Patient Vitals for the past 2 hrs:   BP Temp Pulse Resp   05/07/21 0708 125/64 98.5 °F (36.9 °C) 84 16       Physical Exam:  Cervical Exam:  3 cm dilated    30% effaced    -3 station    Presenting Part: cephalic  Cervical Position: posterior  Consistency: Soft  Uterine Activity: Frequency: Every 4 minutes  Fetal Heart Rate: Reactive  Baseline: 135 per minute  Variability: moderate  Accelerations: yes  Decelerations: none  Uterine contractions: regular, every 4 minutes  Pitocin 6    Assessment/Plan:  Reassuring fetal status. Cat 1 FHT  Preeclampsia: diagnosed by office BPs 130-140/80s and PCr 0.4 prior to admission. PreE labs here are within normal limits and BPs in normal 120-130/70s while resting. GBS Negative  Pitocin started. Will AROM later with further progression.     Brenda Mensah MD

## 2021-05-07 NOTE — PROGRESS NOTES
CM met with pt to complete initial assessment and begin discharge planning.  Pt verified and confirmed demographics. Pt lives with her  - Layla Barbour ([phone]) at the address on file: [address] [place] [address], [address] [place] [address].        Pt is , 36w6d admitting for cervical ripening and induction of labor due to preeclampsia without severe features.  Pt does work but is planning to take 3 months of leave.     Pt reports good family support - both her parents and parents-in-law will be available to assist. Pt plans to breastfeed when baby is born and already has a breast pump. Pt reports having a car seat as well as crib (and bassinet) for baby.      Pt is covered by [employer] and plans to add the baby to this insurance plan. Pt did not utilize [address] [place] [address] or Medicaid services during her pregnancy.      Pediatrician: Dr. Good Over[facility], [facility] [place] [place]    Care Management Interventions  Palliative Care Criteria Met (RRAT>21 & CHF Dx)?: No  Mode of Transport at Discharge:  Other (see comment)()  Transition of Care Consult (CM Consult): Discharge Planning  MyChart Signup: No  Discharge Durable Medical Equipment: No  Physical Therapy Consult: No  Occupational Therapy Consult: No  Speech Therapy Consult: No  Current Support Network: Lives with Spouse  Confirm Follow Up Transport: Family  Discharge Location  Discharge Placement: Home with family assistance    Chase Reed RN

## 2021-05-07 NOTE — PROGRESS NOTES
Pt up to [address]- Pt back to bed, request to stand at bedside at this time, bedpads changed    1937- Pt in bed, toco readjusted, discussed plan of care with pt, pt states \"I am really hungry\"    0110- pt requesting epidural, IV bolus started    0055- Dr. Carissa Limon called to place epidural    0335- New bag of pitocin hung & rate decreased to 9 mu    0513-  Pitocin disconitinued ay this time, will restart in 2 hours

## 2021-05-07 NOTE — PROGRESS NOTES
2300 - Bedside and Verbal shift change report given to MARCELA Flores (oncoming nurse) by Jackelin Mercer. Luna Quintero (offgoing nurse). Report included the following information SBAR, Kardex, Procedure Summary, Intake/Output, MAR and Recent Results. 36 - RN at pt bedside placing pt on EFM before starting pitocin. Pt asleep upon RN entering room; easily awakened. Mac.Montesano - RN at pt bedside starting LR maintenance infusion and pitocin titration at this time. 5 - RN at pt bedside increasing pitocin to 4 mL/hr at this time. 0700 Bedside and Verbal shift change report given to Richar Schmid RN (oncoming nurse) by MARCELA Ricardo RN (offgoing nurse). Report included the following information SBAR, Kardex, Intake/Output, MAR and Recent Results.

## 2021-05-08 ENCOUNTER — ANESTHESIA (OUTPATIENT)
Dept: LABOR AND DELIVERY | Age: 26
DRG: 773 | End: 2021-05-08
Payer: OTHER GOVERNMENT

## 2021-05-08 ENCOUNTER — ANESTHESIA EVENT (OUTPATIENT)
Dept: LABOR AND DELIVERY | Age: 26
DRG: 773 | End: 2021-05-08
Payer: OTHER GOVERNMENT

## 2021-05-08 PROCEDURE — 65270000029 HC RM PRIVATE

## 2021-05-08 PROCEDURE — 75410000002 HC LABOR FEE PER 1 HR: Performed by: OBSTETRICS & GYNECOLOGY

## 2021-05-08 PROCEDURE — 76060000078 HC EPIDURAL ANESTHESIA: Performed by: OBSTETRICS & GYNECOLOGY

## 2021-05-08 PROCEDURE — 75410000003 HC RECOV DEL/VAG/CSECN EA 0.5 HR: Performed by: OBSTETRICS & GYNECOLOGY

## 2021-05-08 PROCEDURE — 77030036554

## 2021-05-08 PROCEDURE — 77030018842 HC SOL IRR SOD CL 9% BAXT -A

## 2021-05-08 PROCEDURE — 77030018831 HC SOL IRR H20 BAXT -A

## 2021-05-08 PROCEDURE — 74011000250 HC RX REV CODE- 250: Performed by: NURSE ANESTHETIST, CERTIFIED REGISTERED

## 2021-05-08 PROCEDURE — 77030018809 HC RETRCTR ALXSO DISP AMR -B

## 2021-05-08 PROCEDURE — 74011250636 HC RX REV CODE- 250/636: Performed by: ANESTHESIOLOGY

## 2021-05-08 PROCEDURE — 74011250636 HC RX REV CODE- 250/636: Performed by: OBSTETRICS & GYNECOLOGY

## 2021-05-08 PROCEDURE — 77030040361 HC SLV COMPR DVT MDII -B

## 2021-05-08 PROCEDURE — 76010000392 HC C SECN EA ADDL 0.5 HR: Performed by: OBSTETRICS & GYNECOLOGY

## 2021-05-08 PROCEDURE — 74011000258 HC RX REV CODE- 258: Performed by: OBSTETRICS & GYNECOLOGY

## 2021-05-08 PROCEDURE — 77030010507 HC ADH SKN DERMBND J&J -B

## 2021-05-08 PROCEDURE — 74011000250 HC RX REV CODE- 250: Performed by: ANESTHESIOLOGY

## 2021-05-08 PROCEDURE — 77030027138 HC INCENT SPIROMETER -A

## 2021-05-08 PROCEDURE — 74011250636 HC RX REV CODE- 250/636: Performed by: NURSE ANESTHETIST, CERTIFIED REGISTERED

## 2021-05-08 PROCEDURE — 2709999900 HC NON-CHARGEABLE SUPPLY

## 2021-05-08 PROCEDURE — 77030014125 HC TY EPDRL BBMI -B: Performed by: ANESTHESIOLOGY

## 2021-05-08 PROCEDURE — 76010000391 HC C SECN FIRST 1 HR: Performed by: OBSTETRICS & GYNECOLOGY

## 2021-05-08 RX ORDER — KETOROLAC TROMETHAMINE 30 MG/ML
30 INJECTION, SOLUTION INTRAMUSCULAR; INTRAVENOUS
Status: DISCONTINUED | OUTPATIENT
Start: 2021-05-08 | End: 2021-05-08

## 2021-05-08 RX ORDER — ZOLPIDEM TARTRATE 5 MG/1
5 TABLET ORAL
Status: DISCONTINUED | OUTPATIENT
Start: 2021-05-08 | End: 2021-05-10 | Stop reason: HOSPADM

## 2021-05-08 RX ORDER — OXYTOCIN/RINGER'S LACTATE 30/500 ML
10 PLASTIC BAG, INJECTION (ML) INTRAVENOUS AS NEEDED
Status: DISCONTINUED | OUTPATIENT
Start: 2021-05-08 | End: 2021-05-10 | Stop reason: HOSPADM

## 2021-05-08 RX ORDER — BUPIVACAINE HYDROCHLORIDE 2.5 MG/ML
INJECTION, SOLUTION EPIDURAL; INFILTRATION; INTRACAUDAL AS NEEDED
Status: DISCONTINUED | OUTPATIENT
Start: 2021-05-08 | End: 2021-05-08 | Stop reason: HOSPADM

## 2021-05-08 RX ORDER — FOLIC ACID/MULTIVIT,IRON,MINER 0.4MG-18MG
1 TABLET ORAL DAILY
Status: DISCONTINUED | OUTPATIENT
Start: 2021-05-09 | End: 2021-05-08

## 2021-05-08 RX ORDER — DIPHENHYDRAMINE HYDROCHLORIDE 50 MG/ML
12.5 INJECTION, SOLUTION INTRAMUSCULAR; INTRAVENOUS
Status: DISCONTINUED | OUTPATIENT
Start: 2021-05-08 | End: 2021-05-10 | Stop reason: HOSPADM

## 2021-05-08 RX ORDER — ONDANSETRON 4 MG/1
4 TABLET, ORALLY DISINTEGRATING ORAL
Status: DISCONTINUED | OUTPATIENT
Start: 2021-05-08 | End: 2021-05-10 | Stop reason: HOSPADM

## 2021-05-08 RX ORDER — IBUPROFEN 800 MG/1
800 TABLET ORAL EVERY 8 HOURS
Status: DISCONTINUED | OUTPATIENT
Start: 2021-05-08 | End: 2021-05-10 | Stop reason: HOSPADM

## 2021-05-08 RX ORDER — ENOXAPARIN SODIUM 100 MG/ML
40 INJECTION SUBCUTANEOUS EVERY 12 HOURS
Status: DISCONTINUED | OUTPATIENT
Start: 2021-05-09 | End: 2021-05-09

## 2021-05-08 RX ORDER — NALOXONE HYDROCHLORIDE 0.4 MG/ML
0.2 INJECTION, SOLUTION INTRAMUSCULAR; INTRAVENOUS; SUBCUTANEOUS
Status: DISCONTINUED | OUTPATIENT
Start: 2021-05-08 | End: 2021-05-08

## 2021-05-08 RX ORDER — HYDROCODONE BITARTRATE AND ACETAMINOPHEN 5; 325 MG/1; MG/1
1 TABLET ORAL
Status: DISCONTINUED | OUTPATIENT
Start: 2021-05-08 | End: 2021-05-10 | Stop reason: HOSPADM

## 2021-05-08 RX ORDER — MORPHINE SULFATE 0.5 MG/ML
INJECTION, SOLUTION EPIDURAL; INTRATHECAL; INTRAVENOUS AS NEEDED
Status: DISCONTINUED | OUTPATIENT
Start: 2021-05-08 | End: 2021-05-08 | Stop reason: HOSPADM

## 2021-05-08 RX ORDER — FENTANYL/BUPIVACAINE/NS/PF 2-1250MCG
1-16 PREFILLED PUMP RESERVOIR EPIDURAL CONTINUOUS
Status: DISCONTINUED | OUTPATIENT
Start: 2021-05-08 | End: 2021-05-08 | Stop reason: HOSPADM

## 2021-05-08 RX ORDER — GUAIFENESIN 100 MG/5ML
81 LIQUID (ML) ORAL DAILY
Status: DISCONTINUED | OUTPATIENT
Start: 2021-05-09 | End: 2021-05-10 | Stop reason: HOSPADM

## 2021-05-08 RX ORDER — SODIUM CHLORIDE, SODIUM LACTATE, POTASSIUM CHLORIDE, CALCIUM CHLORIDE 600; 310; 30; 20 MG/100ML; MG/100ML; MG/100ML; MG/100ML
125 INJECTION, SOLUTION INTRAVENOUS CONTINUOUS
Status: DISCONTINUED | OUTPATIENT
Start: 2021-05-08 | End: 2021-05-08 | Stop reason: HOSPADM

## 2021-05-08 RX ORDER — OXYCODONE HYDROCHLORIDE 5 MG/1
10 TABLET ORAL
Status: DISCONTINUED | OUTPATIENT
Start: 2021-05-08 | End: 2021-05-08

## 2021-05-08 RX ORDER — TRANEXAMIC ACID 100 MG/ML
INJECTION, SOLUTION INTRAVENOUS AS NEEDED
Status: DISCONTINUED | OUTPATIENT
Start: 2021-05-08 | End: 2021-05-08 | Stop reason: HOSPADM

## 2021-05-08 RX ORDER — SODIUM CHLORIDE, SODIUM LACTATE, POTASSIUM CHLORIDE, CALCIUM CHLORIDE 600; 310; 30; 20 MG/100ML; MG/100ML; MG/100ML; MG/100ML
125 INJECTION, SOLUTION INTRAVENOUS CONTINUOUS
Status: DISCONTINUED | OUTPATIENT
Start: 2021-05-08 | End: 2021-05-10 | Stop reason: HOSPADM

## 2021-05-08 RX ORDER — NALOXONE HYDROCHLORIDE 0.4 MG/ML
0.4 INJECTION, SOLUTION INTRAMUSCULAR; INTRAVENOUS; SUBCUTANEOUS AS NEEDED
Status: DISCONTINUED | OUTPATIENT
Start: 2021-05-08 | End: 2021-05-10 | Stop reason: HOSPADM

## 2021-05-08 RX ORDER — ONDANSETRON 2 MG/ML
INJECTION INTRAMUSCULAR; INTRAVENOUS AS NEEDED
Status: DISCONTINUED | OUTPATIENT
Start: 2021-05-08 | End: 2021-05-08 | Stop reason: HOSPADM

## 2021-05-08 RX ORDER — OXYTOCIN/RINGER'S LACTATE 30/500 ML
87.3 PLASTIC BAG, INJECTION (ML) INTRAVENOUS AS NEEDED
Status: DISCONTINUED | OUTPATIENT
Start: 2021-05-08 | End: 2021-05-10 | Stop reason: HOSPADM

## 2021-05-08 RX ORDER — DOCUSATE SODIUM 100 MG/1
100 CAPSULE, LIQUID FILLED ORAL 2 TIMES DAILY
Status: DISCONTINUED | OUTPATIENT
Start: 2021-05-08 | End: 2021-05-10 | Stop reason: HOSPADM

## 2021-05-08 RX ORDER — SIMETHICONE 80 MG
80 TABLET,CHEWABLE ORAL AS NEEDED
Status: DISCONTINUED | OUTPATIENT
Start: 2021-05-08 | End: 2021-05-10 | Stop reason: HOSPADM

## 2021-05-08 RX ORDER — EPHEDRINE SULFATE/0.9% NACL/PF 50 MG/5 ML
10 SYRINGE (ML) INTRAVENOUS
Status: DISCONTINUED | OUTPATIENT
Start: 2021-05-08 | End: 2021-05-08 | Stop reason: HOSPADM

## 2021-05-08 RX ORDER — SODIUM CHLORIDE 0.9 % (FLUSH) 0.9 %
5-40 SYRINGE (ML) INJECTION AS NEEDED
Status: DISCONTINUED | OUTPATIENT
Start: 2021-05-08 | End: 2021-05-10 | Stop reason: HOSPADM

## 2021-05-08 RX ORDER — OXYTOCIN 10 [USP'U]/ML
INJECTION, SOLUTION INTRAMUSCULAR; INTRAVENOUS AS NEEDED
Status: DISCONTINUED | OUTPATIENT
Start: 2021-05-08 | End: 2021-05-08 | Stop reason: HOSPADM

## 2021-05-08 RX ORDER — SWAB
1 SWAB, NON-MEDICATED MISCELLANEOUS DAILY
Status: DISCONTINUED | OUTPATIENT
Start: 2021-05-09 | End: 2021-05-10 | Stop reason: HOSPADM

## 2021-05-08 RX ORDER — NALBUPHINE HYDROCHLORIDE 10 MG/ML
5 INJECTION, SOLUTION INTRAMUSCULAR; INTRAVENOUS; SUBCUTANEOUS
Status: DISCONTINUED | OUTPATIENT
Start: 2021-05-08 | End: 2021-05-08

## 2021-05-08 RX ORDER — LIDOCAINE HYDROCHLORIDE AND EPINEPHRINE 20; 5 MG/ML; UG/ML
INJECTION, SOLUTION EPIDURAL; INFILTRATION; INTRACAUDAL; PERINEURAL AS NEEDED
Status: DISCONTINUED | OUTPATIENT
Start: 2021-05-08 | End: 2021-05-08 | Stop reason: HOSPADM

## 2021-05-08 RX ORDER — LIDOCAINE HYDROCHLORIDE 20 MG/ML
INJECTION, SOLUTION INFILTRATION; PERINEURAL AS NEEDED
Status: DISCONTINUED | OUTPATIENT
Start: 2021-05-08 | End: 2021-05-08 | Stop reason: HOSPADM

## 2021-05-08 RX ORDER — KETOROLAC TROMETHAMINE 30 MG/ML
30 INJECTION, SOLUTION INTRAMUSCULAR; INTRAVENOUS
Status: DISPENSED | OUTPATIENT
Start: 2021-05-08 | End: 2021-05-09

## 2021-05-08 RX ORDER — OXYCODONE HYDROCHLORIDE 5 MG/1
5 TABLET ORAL
Status: DISCONTINUED | OUTPATIENT
Start: 2021-05-08 | End: 2021-05-08

## 2021-05-08 RX ORDER — LIDOCAINE HYDROCHLORIDE AND EPINEPHRINE 15; 5 MG/ML; UG/ML
INJECTION, SOLUTION EPIDURAL AS NEEDED
Status: DISCONTINUED | OUTPATIENT
Start: 2021-05-08 | End: 2021-05-08 | Stop reason: HOSPADM

## 2021-05-08 RX ADMIN — LIDOCAINE HYDROCHLORIDE AND EPINEPHRINE 3 ML: 15; 5 INJECTION, SOLUTION EPIDURAL at 01:11

## 2021-05-08 RX ADMIN — OXYTOCIN 9 MILLI-UNITS/MIN: 10 INJECTION, SOLUTION INTRAMUSCULAR; INTRAVENOUS at 03:35

## 2021-05-08 RX ADMIN — SODIUM CHLORIDE, POTASSIUM CHLORIDE, SODIUM LACTATE AND CALCIUM CHLORIDE 125 ML/HR: 600; 310; 30; 20 INJECTION, SOLUTION INTRAVENOUS at 13:40

## 2021-05-08 RX ADMIN — ONDANSETRON HYDROCHLORIDE 4 MG: 2 SOLUTION INTRAMUSCULAR; INTRAVENOUS at 09:38

## 2021-05-08 RX ADMIN — BUPIVACAINE HYDROCHLORIDE 10 ML: 2.5 INJECTION, SOLUTION EPIDURAL; INFILTRATION; INTRACAUDAL; PERINEURAL at 01:14

## 2021-05-08 RX ADMIN — KETOROLAC TROMETHAMINE 30 MG: 30 INJECTION, SOLUTION INTRAMUSCULAR; INTRAVENOUS at 12:24

## 2021-05-08 RX ADMIN — OXYTOCIN 2 MILLI-UNITS/MIN: 10 INJECTION, SOLUTION INTRAMUSCULAR; INTRAVENOUS at 07:08

## 2021-05-08 RX ADMIN — TRANEXAMIC ACID 1 G: 100 INJECTION, SOLUTION INTRAVENOUS at 09:56

## 2021-05-08 RX ADMIN — CEFAZOLIN SODIUM 3 G: 10 INJECTION, POWDER, FOR SOLUTION INTRAVENOUS at 08:50

## 2021-05-08 RX ADMIN — SODIUM CHLORIDE, POTASSIUM CHLORIDE, SODIUM LACTATE AND CALCIUM CHLORIDE 125 ML/HR: 600; 310; 30; 20 INJECTION, SOLUTION INTRAVENOUS at 07:08

## 2021-05-08 RX ADMIN — LIDOCAINE HYDROCHLORIDE 3 ML: 20 INJECTION, SOLUTION INFILTRATION; PERINEURAL at 01:05

## 2021-05-08 RX ADMIN — AZITHROMYCIN DIHYDRATE 0.5 G: 500 INJECTION, POWDER, LYOPHILIZED, FOR SOLUTION INTRAVENOUS at 09:34

## 2021-05-08 RX ADMIN — SODIUM CHLORIDE, POTASSIUM CHLORIDE, SODIUM LACTATE AND CALCIUM CHLORIDE: 600; 310; 30; 20 INJECTION, SOLUTION INTRAVENOUS at 09:04

## 2021-05-08 RX ADMIN — CEFAZOLIN SODIUM 3 G: 10 INJECTION, POWDER, FOR SOLUTION INTRAVENOUS at 16:27

## 2021-05-08 RX ADMIN — Medication 12 ML/HR: at 01:46

## 2021-05-08 RX ADMIN — CEFAZOLIN SODIUM 3 G: 10 INJECTION, POWDER, FOR SOLUTION INTRAVENOUS at 21:46

## 2021-05-08 RX ADMIN — LIDOCAINE HYDROCHLORIDE,EPINEPHRINE BITARTRATE 10 ML: 20; .005 INJECTION, SOLUTION EPIDURAL; INFILTRATION; INTRACAUDAL; PERINEURAL at 09:04

## 2021-05-08 RX ADMIN — SODIUM CHLORIDE, POTASSIUM CHLORIDE, SODIUM LACTATE AND CALCIUM CHLORIDE 125 ML/HR: 600; 310; 30; 20 INJECTION, SOLUTION INTRAVENOUS at 21:46

## 2021-05-08 RX ADMIN — LIDOCAINE HYDROCHLORIDE,EPINEPHRINE BITARTRATE 5 ML: 20; .005 INJECTION, SOLUTION EPIDURAL; INFILTRATION; INTRACAUDAL; PERINEURAL at 09:15

## 2021-05-08 RX ADMIN — KETOROLAC TROMETHAMINE 30 MG: 30 INJECTION, SOLUTION INTRAMUSCULAR; INTRAVENOUS at 18:08

## 2021-05-08 RX ADMIN — OXYTOCIN 30 UNITS: 10 INJECTION, SOLUTION INTRAMUSCULAR; INTRAVENOUS at 09:52

## 2021-05-08 RX ADMIN — MORPHINE SULFATE 2.5 MG: 0.5 INJECTION, SOLUTION EPIDURAL; INTRATHECAL; INTRAVENOUS at 09:58

## 2021-05-08 RX ADMIN — LIDOCAINE HYDROCHLORIDE,EPINEPHRINE BITARTRATE 5 ML: 20; .005 INJECTION, SOLUTION EPIDURAL; INFILTRATION; INTRACAUDAL; PERINEURAL at 09:10

## 2021-05-08 NOTE — PROGRESS NOTES
The patient is comfortable with her epidural, is without complaints, and continues to deny preeclampsia symptoms. Informed that pitocin was decreased secondary to late decelerations that occurred s/p epidural placement. Visit Vitals  BP (!) 109/58   Pulse 63   Temp 98.1 °F (36.7 °C)   Resp 16   Ht 5' 6\" (1.676 m)   Wt 150.6 kg (332 lb)   SpO2 99%   BMI 53.59 kg/m²     FHR: 135 moderate variability with accelerations, no decelerations, cat 1  IUPC: contractions q 1-5 minutes, pitocin at 13 mu  SVE: 4-5/50-3 vtx, FSE placed    Ass/Plan:  at 37 1/7 wks IOL for preeclampsia, cat 1 fetal tracing, s/p cook catheter, pitocin administration for 23 hours    Two hour pitocin break and then resume pitocin as per protocol.

## 2021-05-08 NOTE — OP NOTES
Section Operative Report       Patient: Sarina aCmpos MRN: [id number]  SSN: [id number]    YOB: 1995  Age: 31 y.o. Sex: female       Date of Procedure: 2021     Preoperative Diagnosis:   Pregnant at 37.1 weeks  Pre-Eclampsia  Maternal obesity  Failure to progress    Postoperative Diagnosis:   Pregnant at 37.1 weeks  Pre-Eclampsia  Maternal obesity  Failure to progress    Procedure: Primary Low Transverse  SECTION via Pfannenstiel skin incision    Surgeon(s):  Muna August MD  Assistant:   Tremaine de jesus    Anesthesia: Epidural    Estimated Blood Loss :  1100ml    Findings:   Information for the patient's :  Don Francois [[id number]]   Delivery of a 3.305 kg female infant on 2021 at 9:50 AM. Apgars were 9  and 9 . Umbilical Cord: 3 Vessels     Umbilical Cord Events: None     Placenta: Manual Removal removal with Normal;Intact appearance. Amniotic Fluid Volume:        Amniotic Fluid Description:  Clear       Specimens: * No specimens in log *            Complications:  none    Procedure Details:    After informed consent was obtained, the patient was administered pre-operative antibiotics and taken to the operating room, where Spinal anesthesia was administered and found to be adequate. Nickerson catheter had been placed using sterile technique. The patient was prepped and draped in the usual sterile fashion. After testing for adequate anesthesia, a Pfannenstiel incision was made with a scalpel and carried down to the anterior rectus fascia with the Bovie. The fascia was nicked in the midline and the incision in the fascia was extended laterally with the Bovie. The inferior aspect of the fascial incision was grasped with Kocher clamps, tented up, and the rectus muscles were  from the fascial sheath both bluntly and sharply with Terrell scissors.  The superior aspect of the fascial incision in similar fashion was tented up and the rectus muscles were dissected off first bluntly and then sharply with Bovie electrocautery. The muscles were then parted in the midline, the peritoneum was entered sharply with Metzenbaum scissors and stretched. The bladder blade was then inserted. The vesicouterine peritoneum was identified and entered sharply with Metzenbaum scissors and the incision was extended laterally with the scissors. The bladder flap was then created digitally. An Rowdy retractor was placed in the abdomen with care taken to ensure of no bowel entrapment. A low transverse uterine incision was made with the scalpel and extended laterally with blunt finger dissection. Amniotomy was performed. The babys head was then delivered atraumatically followed by the remainder of the body. The nose and mouth were suctioned. The cord was clamped and cut and the baby was handed off to the waiting staff. Cord blood was sent for analysis. The placenta was then manually extracted from the uterus. The uterus was thoroughly cleared of all clots and debris using a moist lap sponge. The uterine incision was closed with number 0-PDS in a running locked fashion with a second layer of 0-PDS used to imbricate the incision. Excellent hemostasis was noted. Both lateral gutters were irrigated with warm normal saline and good hemostasis was again reassured throughout, including the hysterotomy, bladder flap, rectus muscles and posterior surface of the fascia. Seprafilm was placed over the SHAWN and anterior surface of the uterus. The peritoneum and rectus muscles were loosely re-approximated with 2-0 Monocryl. The fascia was closed with 0-PDS in a running fashion. Good hemostasis was assured. The subcutaneous space was re-approximated with 5 interupted 2-0 Monocryl stitches. The skin was closed with a 4-0 Monocryl in a subcuticular fashion. Dermabond was placed over the incision. The patient tolerated the procedure well.  Sponge, lap, and needle counts were correct times three and the patient and baby were taken to recovery room in stable condition.     Signed By: Brenda Mensah MD     May 8, 2021

## 2021-05-08 NOTE — PROGRESS NOTES
0700: SBAR report received from Senait Silva RN.    1944: Complete pericare done with warm soapy water. 3365: Pt sitting up eating clear liquid diet. 9680: Dr. Davi Stone at bedside evaluating patient and discussing options for delivery with patient. Patient verbalizes that she would rather proceed with a  at this time. Casey Fang discussing risks and benefits of  vs continuing with trial of labor. Pt verbalizes understanding and both pt and  agree to proceed with . Md in agreement with plan. 9656: VORB to stop Pitocin induction at this time and prepare for . 5720: CHG wipes x 2 to abdomen and thighs    0831: IUPC removed by Dr. Davi Stone    1340: TRANSFER - OUT REPORT:    Verbal report given to NANCY Terrell RN(name) on Darlene Richter  being transferred to MIU(unit) for routine progression of care       Report consisted of patients Situation, Background, Assessment and   Recommendations(SBAR). Information from the following report(s) SBAR, Kardex, OR Summary, Procedure Summary, Intake/Output and MAR was reviewed with the receiving nurse. Lines:   Peripheral IV 21 Left;Posterior Hand (Active)   Site Assessment Clean, dry, & intact 21 0708   Phlebitis Assessment 0 21 0708   Infiltration Assessment 0 21 0708   Dressing Status Clean, dry, & intact 21 0708   Dressing Type Tape;Transparent 21 0708   Hub Color/Line Status Infusing 21 0708   Alcohol Cap Used Yes 21 0545        Opportunity for questions and clarification was provided.       Patient transported with:   Registered Nurse

## 2021-05-08 NOTE — LACTATION NOTE
This note was copied from a baby's chart. This is mother's first baby. Baby born at 37.1 weeks. She tried to breastfeed earlier and again with [facility] but baby not interested. Mother taught to do skin to skin and hand express 10-20 drops for colostrum  Q 2-3 hr. if baby does not breastfeed. Reviewed feeding cues and what to expect re: baby's output. Discussed with mother her plan for feeding. Reviewed the benefits of exclusive breast milk feeding during the hospital stay. Informed her of the risks of using formula to supplement in the first few days of life as well as the benefits of successful breast milk feeding; referred her to the Breastfeeding booklet about this information. She acknowledges understanding of information reviewed and states that it is her plan to breast/bottle feed her infant. Will support her choice and offer additional information as needed. Encouraged mom to attempt feeding with baby led feeding cues. Just as sucking on fingers, rooting, mouthing. Looking for 8-12 feedings in 24 hours. Don't limit baby at breast, allow baby to come of breast on it's own. Baby may want to feed  often and may increase number of feedings on second day of life. Skin to skin encouraged. If baby doesn't nurse,  Mom should  hand express  10-20 drops of colostrum and drip into baby's mouth, or give to baby by finger feeding, cup feeding, or spoon feeding at least every 2-3 hours. Mother will successfully establish breastfeeding by feeding in response to early feeding cues   or wake every 3h, will obtain deep latch, and will keep log of feedings/output. Taught to BF at hunger cues and or q 2-3 hrs and to offer 10-20 drops of hand expressed colostrum at any non-feeds.       Breast Assessment  Left Breast: Extra large  Left Nipple: Everted, Intact  Right Breast: Extra large  Right Nipple: Everted, Intact  Breast- Feeding Assessment  Attends Breast-Feeding Classes: Yes  Breast-Feeding Experience: No  Breast Trauma/Surgery: No  Type/Quality: Attempted  Lactation Consultant Visits  Breast-Feedings: Attempted breast-feeding(Breastfeeding attempted. Baby too sleepy. Mother taught hand expression - 20 large drops of colostrum expressed and finger fed to baby and baby placed skin to skin.)  Mother/Infant Observation  Mother Observation: Alignment, Holds breast, Close hold  Infant Observation: Opens mouth  LATCH Documentation  Latch: (breastfeeding attempted. Baby sleepy. 20 drops colostrum expressed/finger fed.)   Mother given breastfeeding handouts and LC#.

## 2021-05-08 NOTE — PROGRESS NOTES
Labor Progress Note    Had a long discussion with the patient and her  about the plan for the day. We discussed continued attempt at Pitocin induction v. 1* LTCS for failure to progress. The patient stated immediately that it was her desire to move forward with delivery as soon as possible and she \"just wants her out safely\". We will proceed with 1* LTCS. Patient Vitals for the past 2 hrs:   BP Temp Pulse Resp SpO2   21 0716     99 %   21 0711     100 %   21 0708 (!) 104/55 97.7 °F (36.5 °C) 71 17    21 0653 (!) 108/57  79         Physical Exam:  Cervical Exam:  3 cm dilated    50% effaced    -3 station    Presenting Part: cephalic  Uterine Activity: Frequency: Every 5 minutes  Fetal Heart Rate: Reactive  Baseline: 135 per minute  Variability: moderate  Accelerations: yes  Decelerations: none    Assessment/Plan:  Reassuring fetal status, Proceed with  Section Reassuring fetal status with labor progressing normally, findings consistent with failure to progress. Recommended proceeding with  delivery. Risks of bleeding, infection, bladder and bowel damage explained to patient and . They understand the situation and consent to the  delivery.            Liv Celaya MD

## 2021-05-08 NOTE — PROGRESS NOTES
[facility] Pharm[facility] Dosing Services: 5/8/21    Pharmacist made change to enoxaparin therapy based on:  [ X ] BMI: dose changed to:  40mg SC every 12 hrs (BMI 53)      Wt Readings from Last 1 Encounters:   05/06/21 150.6 kg (332 lb)       Ht Readings from Last 1 Encounters:   05/06/21 167.6 cm (66\")           Previous Dose Lovenox 40mg SC every 24 hrs   Creatinine Clearance Estimated Creatinine Clearance: 182.6 mL/min (A) (by C-G formula based on SCr of 0.5 mg/dL (L)). Creatinine Lab Results   Component Value Date/Time    Creatinine 0.50 (L) 05/06/2021 05:07 PM       Platelet Lab Results   Component Value Date/Time    PLATELET 045 58/87/1617 05:07 PM      H/H Lab Results   Component Value Date/Time    HGB 11.6 05/06/2021 05:07 PM              Pharmacy to automatically make dose adjustment for renal dysfunction (creatinine clearance less than 30 mL/min)  Pharmacy to automatically make dose adjustment for obesity (BMI greater than 40)  Pharmacy to make dose rounding adjustments per [facility] dose adjustment scale. Pharmacy to monitor patients progress. Will make dose adjustment as needed per changing renal function. Will communicate further recommendations regarding patients anticoagulation therapy with prescriber. Signed Glo HAYWOOD  [address] information: [phone]

## 2021-05-08 NOTE — ANESTHESIA PREPROCEDURE EVALUATION
Anesthetic History   No history of anesthetic complications            Review of Systems / Medical History  Patient summary reviewed and pertinent labs reviewed    Pulmonary  Within defined limits                 Neuro/Psych   Within defined limits           Cardiovascular    Hypertension                   GI/Hepatic/Renal  Within defined limits              Endo/Other        Morbid obesity     Other Findings   Comments: Preeclampsia           Physical Exam    Airway  Mallampati: III           Cardiovascular               Dental         Pulmonary                 Abdominal         Other Findings            Anesthetic Plan    ASA: 3  Anesthesia type: epidural          Induction: Intravenous  Anesthetic plan and risks discussed with: Patient

## 2021-05-08 NOTE — ANESTHESIA PROCEDURE NOTES
Epidural Block    Patient location during procedure: OB  Start time: 5/8/2021 1:04 AM  End time: 5/8/2021 1:14 AM  Reason for block: labor epidural  Staffing  Performed: attending   Anesthesiologist: Nancy Jolley MD  Preanesthetic Checklist  Completed: patient identified, IV checked, site marked, risks and benefits discussed, surgical consent, monitors and equipment checked, pre-op evaluation and timeout performed  Block Placement  Patient position: sitting  Prep: ChloraPrep  Sterility prep: cap, drape, gloves, hand and mask  Sedation level: no sedation  Patient monitoring: heart rate and continuous pulse oximetry  Approach: midline  Location: lumbar  Epidural  Loss of resistance technique: saline  Guidance: landmark technique  Needle  Needle type: Tuohy   Needle gauge: 17 G  Needle length: 9 cm  Catheter type: multi-orifice  Catheter size: 20 G  Catheter securement method: clear occlusive dressing and surgical tape  Test dose: negative  Assessment  Block outcome: pain improved  Number of attempts: 1  Procedure assessment: patient tolerated procedure well with no complications

## 2021-05-08 NOTE — PROGRESS NOTES
The patient has recently received her Nubain and is resting comfortably. Visit Vitals  /62   Pulse 71   Temp 98.1 °F (36.7 °C)   Resp 16   Ht 5' 6\" (1.676 m)   Wt 150.6 kg (332 lb)   SpO2 98%   BMI 53.59 kg/m²     FHR: 140 moderate variability, accelerations present, no decelerations, cat 1  [place]: contractions not tracing adequately secondary to maternal habitus, pitocin at 16 mu  EFW: 7 pounds  SVE: 4-5/50/-3 vtx, IUPC inserted    Ass/Plan:  at 37 wks IOL for preeclampsia, reactive cat 1 fetal tracing  Titrate pitocin prn adequate MVUs.

## 2021-05-08 NOTE — ANESTHESIA POSTPROCEDURE EVALUATION
Procedure(s):   SECTION. epidural    Anesthesia Post Evaluation      Multimodal analgesia: multimodal analgesia not used between 6 hours prior to anesthesia start to PACU discharge  Patient location during evaluation: PACU  Patient participation: complete - patient participated  Level of consciousness: awake  Pain management: adequate  Airway patency: patent  Anesthetic complications: no  Cardiovascular status: acceptable, blood pressure returned to baseline and hemodynamically stable  Respiratory status: acceptable  Hydration status: acceptable  Post anesthesia nausea and vomiting:  controlled      INITIAL Post-op Vital signs:   Vitals Value Taken Time   /60 21 1246   Temp 36.9 °C (98.5 °F) 21 1047   Pulse 88 21 1246   Resp 16 21 1102   SpO2 96 % 21 1245   Vitals shown include unvalidated device data.

## 2021-05-09 LAB
BASOPHILS # BLD: 0.1 K/UL (ref 0–0.1)
BASOPHILS NFR BLD: 0 % (ref 0–1)
DIFFERENTIAL METHOD BLD: ABNORMAL
EOSINOPHIL # BLD: 0.2 K/UL (ref 0–0.4)
EOSINOPHIL NFR BLD: 1 % (ref 0–7)
ERYTHROCYTE [DISTWIDTH] IN BLOOD BY AUTOMATED COUNT: 13.3 % (ref 11.5–14.5)
HCT VFR BLD AUTO: 28.7 % (ref 35–47)
HGB BLD-MCNC: 8.8 G/DL (ref 11.5–16)
IMM GRANULOCYTES # BLD AUTO: 0.1 K/UL (ref 0–0.04)
IMM GRANULOCYTES NFR BLD AUTO: 1 % (ref 0–0.5)
LYMPHOCYTES # BLD: 2.5 K/UL (ref 0.8–3.5)
LYMPHOCYTES NFR BLD: 15 % (ref 12–49)
MCH RBC QN AUTO: 29.4 PG (ref 26–34)
MCHC RBC AUTO-ENTMCNC: 30.7 G/DL (ref 30–36.5)
MCV RBC AUTO: 96 FL (ref 80–99)
MONOCYTES # BLD: 1.1 K/UL (ref 0–1)
MONOCYTES NFR BLD: 7 % (ref 5–13)
NEUTS SEG # BLD: 12 K/UL (ref 1.8–8)
NEUTS SEG NFR BLD: 76 % (ref 32–75)
NRBC # BLD: 0 K/UL (ref 0–0.01)
NRBC BLD-RTO: 0 PER 100 WBC
PLATELET # BLD AUTO: 233 K/UL (ref 150–400)
PMV BLD AUTO: 10.6 FL (ref 8.9–12.9)
RBC # BLD AUTO: 2.99 M/UL (ref 3.8–5.2)
WBC # BLD AUTO: 16 K/UL (ref 3.6–11)

## 2021-05-09 PROCEDURE — 85025 COMPLETE CBC W/AUTO DIFF WBC: CPT

## 2021-05-09 PROCEDURE — 74011250637 HC RX REV CODE- 250/637: Performed by: OBSTETRICS & GYNECOLOGY

## 2021-05-09 PROCEDURE — 74011250636 HC RX REV CODE- 250/636: Performed by: OBSTETRICS & GYNECOLOGY

## 2021-05-09 PROCEDURE — 74011000258 HC RX REV CODE- 258: Performed by: OBSTETRICS & GYNECOLOGY

## 2021-05-09 PROCEDURE — 2709999900 HC NON-CHARGEABLE SUPPLY

## 2021-05-09 PROCEDURE — 36415 COLL VENOUS BLD VENIPUNCTURE: CPT

## 2021-05-09 PROCEDURE — 65270000029 HC RM PRIVATE

## 2021-05-09 RX ORDER — ENOXAPARIN SODIUM 100 MG/ML
40 INJECTION SUBCUTANEOUS EVERY 24 HOURS
Status: DISCONTINUED | OUTPATIENT
Start: 2021-05-10 | End: 2021-05-10 | Stop reason: HOSPADM

## 2021-05-09 RX ORDER — BISACODYL 5 MG
5 TABLET, DELAYED RELEASE (ENTERIC COATED) ORAL DAILY PRN
Status: DISCONTINUED | OUTPATIENT
Start: 2021-05-09 | End: 2021-05-10 | Stop reason: HOSPADM

## 2021-05-09 RX ADMIN — ENOXAPARIN SODIUM 40 MG: 40 INJECTION SUBCUTANEOUS at 09:02

## 2021-05-09 RX ADMIN — SIMETHICONE 80 MG: 80 TABLET, CHEWABLE ORAL at 06:04

## 2021-05-09 RX ADMIN — Medication 10 ML: at 00:21

## 2021-05-09 RX ADMIN — BISACODYL 5 MG: 5 TABLET, COATED ORAL at 16:10

## 2021-05-09 RX ADMIN — CEFAZOLIN SODIUM 3 G: 10 INJECTION, POWDER, FOR SOLUTION INTRAVENOUS at 03:51

## 2021-05-09 RX ADMIN — IBUPROFEN 800 MG: 800 TABLET, FILM COATED ORAL at 21:25

## 2021-05-09 RX ADMIN — DOCUSATE SODIUM 100 MG: 100 CAPSULE, LIQUID FILLED ORAL at 09:02

## 2021-05-09 RX ADMIN — HYDROCODONE BITARTRATE AND ACETAMINOPHEN 1 TABLET: 5; 325 TABLET ORAL at 12:45

## 2021-05-09 RX ADMIN — IBUPROFEN 800 MG: 800 TABLET, FILM COATED ORAL at 14:47

## 2021-05-09 RX ADMIN — KETOROLAC TROMETHAMINE 30 MG: 30 INJECTION, SOLUTION INTRAMUSCULAR; INTRAVENOUS at 00:21

## 2021-05-09 RX ADMIN — ASPIRIN 81 MG: 81 TABLET, CHEWABLE ORAL at 09:02

## 2021-05-09 RX ADMIN — DOCUSATE SODIUM 100 MG: 100 CAPSULE, LIQUID FILLED ORAL at 21:25

## 2021-05-09 RX ADMIN — Medication 1 TABLET: at 09:02

## 2021-05-09 RX ADMIN — MUPIROCIN: 20 OINTMENT TOPICAL at 06:04

## 2021-05-09 RX ADMIN — IBUPROFEN 800 MG: 800 TABLET, FILM COATED ORAL at 06:04

## 2021-05-09 NOTE — ROUTINE PROCESS
Bedside and Verbal shift change report given to Renetta Torrez rn (oncoming nurse) by MOUNA Broussard (offgoing nurse).  Report given with SBAR, Kardex, Intake/Output and MAR.

## 2021-05-09 NOTE — PROGRESS NOTES
Post-Operative  Day 1    [address] for the patient's :  Kenan Guy [[id number]]   , Low Transverse      Patient doing well without unusual complaints. Ambulates, voids, and tolerate regular diet. Breast feeding. Pain controlled. She knows she will be receiving Lovenox starting today. Vitals:  Visit Vitals  BP (!) 102/59 (BP 1 Location: Right arm, BP Patient Position: At rest)   Pulse 84   Temp 98.2 °F (36.8 °C)   Resp 16   Ht 5' 6\" (1.676 m)   Wt 150.6 kg (332 lb)   SpO2 96%   Breastfeeding Unknown   BMI 53.59 kg/m²     Temp (24hrs), Av.2 °F (36.8 °C), Min:97.8 °F (36.6 °C), Max:98.5 °F (36.9 °C)      Last 24hr Input/Output:    Intake/Output Summary (Last 24 hours) at 2021 0850  Last data filed at 2021 0120  Gross per 24 hour   Intake 550 ml   Output 2995 ml   Net -2445 ml      Exam:       Patient without distress. Abdomen:soft, expected no tenderness, fundus firm @U-2  Inc: c/d/i; Dermabond  Lower extremities are no tenderness with 1+ edema.     Labs:   Lab Results   Component Value Date/Time    WBC 16.0 (H) 2021 04:03 AM    WBC 14.4 (H) 2021 05:07 PM    WBC 7.4 2013 10:48 AM    HGB 8.8 (L) 2021 04:03 AM    HGB 11.6 2021 05:07 PM    HGB 13.6 2013 10:48 AM    HCT 28.7 (L) 2021 04:03 AM    HCT 35.8 2021 05:07 PM    HCT 39.0 2013 10:48 AM    PLATELET 208  04:03 AM    PLATELET 333  05:07 PM    PLATELET 055  10:48 AM       Recent Results (from the past 24 hour(s))   CBC WITH AUTOMATED DIFF    Collection Time: 21  4:03 AM   Result Value Ref Range    WBC 16.0 (H) 3.6 - 11.0 K/uL    RBC 2.99 (L) 3.80 - 5.20 M/uL    HGB 8.8 (L) 11.5 - 16.0 g/dL    HCT 28.7 (L) 35.0 - 47.0 %    MCV 96.0 80.0 - 99.0 FL    MCH 29.4 26.0 - 34.0 PG    MCHC 30.7 30.0 - 36.5 g/dL    RDW 13.3 11.5 - 14.5 %    PLATELET 789 282 - 648 K/uL    MPV 10.6 8.9 - 12.9 FL    NRBC 0.0 0  WBC ABSOLUTE NRBC 0.00 0.00 - 0.01 K/uL    NEUTROPHILS 76 (H) 32 - 75 %    LYMPHOCYTES 15 12 - 49 %    MONOCYTES 7 5 - 13 %    EOSINOPHILS 1 0 - 7 %    BASOPHILS 0 0 - 1 %    IMMATURE GRANULOCYTES 1 (H) 0.0 - 0.5 %    ABS. NEUTROPHILS 12.0 (H) 1.8 - 8.0 K/UL    ABS. LYMPHOCYTES 2.5 0.8 - 3.5 K/UL    ABS. MONOCYTES 1.1 (H) 0.0 - 1.0 K/UL    ABS. EOSINOPHILS 0.2 0.0 - 0.4 K/UL    ABS. BASOPHILS 0.1 0.0 - 0.1 K/UL    ABS. IMM. GRANS. 0.1 (H) 0.00 - 0.04 K/UL    DF AUTOMATED         Assessment: Post-Op day 1, stable; O+/RI/ XX \"Keila\"    Plan:   1. Routine post-operative care   2. Asymptomatic anemia. 3. PreE: BPs normal without meds.    4. Lovenox for VTE prophylaxis

## 2021-05-09 NOTE — LACTATION NOTE
This note was copied from a baby's chart. Mother states baby last breast fed one hour ago for 33 minutes. She has been hand expressing drops of colostrum if baby is too sleepy to breast feed. Current infant weight loss is -2.2% (WNL). Reviewed breastfeeding basics:  Supply and demand, breastfeed baby 8-12 times in 24 hr, feed baby on demand,  stomach size, early  Feeding cues, skin to skin, positioning and baby led latch-on, assymetrical latch with signs of good, deep latch vs shallow, feeding frequency and duration, and log sheet for tracking infant feedings and output. Breastfeeding Booklet and Warm line information given. Discussed typical  weight loss and the importance of infant weight checks with pediatrician 1-2 post discharge. Mother will successfully establish breastfeeding by feeding in response to early feeding cues   or wake every 3h, will obtain deep latch, and will keep log of feedings/output. Taught to BF at hunger cues and or q 2-3 hrs and to offer 10-20 drops of hand expressed colostrum at any non-feeds. Breast Assessment  Left Breast: Extra large  Left Nipple: Everted, Intact  Right Breast: Extra large  Right Nipple: Everted, Intact  Breast- Feeding Assessment  Attends Breast-Feeding Classes: Yes  Breast-Feeding Experience: No  Breast Trauma/Surgery: No  Type/Quality: Fair(Mother states baby sleepy at times and she has been hand expressing drops of colostrum for baby.)  Lactation Consultant Visits  Breast-Feedings: (Mother states baby last breast fed 1 hr. ago for 33 minutes on right breast in football hold.)    Instructed mother to call [facility] for breastfeeding assistance.

## 2021-05-09 NOTE — ROUTINE PROCESS
Bedside and Verbal shift change report given to Camilla Litten RN (oncoming nurse) by Carl Wolf. Katherine Mooney (offgoing nurse). Report included the following information SBAR, Procedure Summary, Intake/Output, MAR, Accordion, Recent Results and Med Rec Status.

## 2021-05-09 NOTE — PROGRESS NOTES
[facility] Pharmacy Dosing Services: 5/9/21    Pharmacist made change to enoxaparin therapy based on:  [ X ] BMI: dose changed to:  40mg SC every 24 hrs (dose not adjusted for LD/MIU patients w/ BMI > 40)      Wt Readings from Last 1 Encounters:   05/06/21 150.6 kg (332 lb)       Ht Readings from Last 1 Encounters:   05/06/21 167.6 cm (66\")           Previous Dose Lovenox 40mg SC every 12 hrs   Creatinine Clearance Estimated Creatinine Clearance: 182.6 mL/min (A) (by C-G formula based on SCr of 0.5 mg/dL (L)). Creatinine Lab Results   Component Value Date/Time    Creatinine 0.50 (L) 05/06/2021 05:07 PM       Platelet Lab Results   Component Value Date/Time    PLATELET 927 71/51/5302 04:03 AM      H/H Lab Results   Component Value Date/Time    HGB 8.8 (L) 05/09/2021 04:03 AM            Pharmacy to monitor patients progress. Will make dose adjustment as needed per changing renal function. Will communicate further recommendations regarding patients anticoagulation therapy with prescriber. Signed Chuy HAYWOOD  [address] information: [phone]

## 2021-05-10 VITALS
RESPIRATION RATE: 20 BRPM | TEMPERATURE: 98.3 F | OXYGEN SATURATION: 96 % | WEIGHT: 293 LBS | BODY MASS INDEX: 47.09 KG/M2 | HEART RATE: 84 BPM | SYSTOLIC BLOOD PRESSURE: 138 MMHG | DIASTOLIC BLOOD PRESSURE: 78 MMHG | HEIGHT: 66 IN

## 2021-05-10 PROCEDURE — 74011250636 HC RX REV CODE- 250/636: Performed by: OBSTETRICS & GYNECOLOGY

## 2021-05-10 PROCEDURE — 74011250637 HC RX REV CODE- 250/637: Performed by: OBSTETRICS & GYNECOLOGY

## 2021-05-10 RX ORDER — DOCUSATE SODIUM 100 MG/1
100 CAPSULE, LIQUID FILLED ORAL 2 TIMES DAILY
Qty: 60 CAP | Refills: 2 | Status: SHIPPED | OUTPATIENT
Start: 2021-05-10 | End: 2021-08-08

## 2021-05-10 RX ORDER — IBUPROFEN 800 MG/1
800 TABLET ORAL
Qty: 60 TAB | Refills: 0 | Status: SHIPPED | OUTPATIENT
Start: 2021-05-10

## 2021-05-10 RX ORDER — HYDROCODONE BITARTRATE AND ACETAMINOPHEN 5; 325 MG/1; MG/1
1 TABLET ORAL
Qty: 20 TAB | Refills: 0 | Status: SHIPPED | OUTPATIENT
Start: 2021-05-10 | End: 2021-05-13

## 2021-05-10 RX ADMIN — IBUPROFEN 800 MG: 800 TABLET, FILM COATED ORAL at 05:11

## 2021-05-10 RX ADMIN — DOCUSATE SODIUM 100 MG: 100 CAPSULE, LIQUID FILLED ORAL at 10:56

## 2021-05-10 RX ADMIN — ENOXAPARIN SODIUM 40 MG: 40 INJECTION SUBCUTANEOUS at 10:49

## 2021-05-10 RX ADMIN — ASPIRIN 81 MG: 81 TABLET, CHEWABLE ORAL at 10:56

## 2021-05-10 RX ADMIN — Medication 1 TABLET: at 10:56

## 2021-05-10 RX ADMIN — HYDROCODONE BITARTRATE AND ACETAMINOPHEN 1 TABLET: 5; 325 TABLET ORAL at 02:52

## 2021-05-10 NOTE — PROGRESS NOTES
Patient discharged to home with infant and significant other. Infant in carseat. Patient in wheelchair. Prescriptions e sent to pharmacy by OB . Discharge instructions reviewed with patient and significant other, signed by patient, and copies given. Per patient and significant other, no questions. Patient and infant bands verified. See infant note and/or footprint sheet on chart.

## 2021-05-10 NOTE — PROGRESS NOTES
Post-Operative  Day 2    Araseli aTylor       Assessment: Post-Op day 2, doing well s/p PLTCS for failed IOL for preeclampsia    Plan:   1. Discharge home today  2. Follow up in office in 2 weeks with Shai Haynes MD for BP check   3. Post partum activity/wound care advised, diet as tolerated  4. Discharge Medications: ibuprofen, norco, iron, colace, and medications prior to admission      Information for the patient's :  Veronica Eye [[id number]]   , Low Transverse    Patient doing well without significant complaint. Tolerating diet, passing flatus, voiding and ambulating without difficulty    Vitals:  Visit Vitals  /71 (BP 1 Location: Right upper arm, BP Patient Position: At rest)   Pulse 94   Temp 97.6 °F (36.4 °C)   Resp 18   Ht 5' 6\" (1.676 m)   Wt 150.6 kg (332 lb)   SpO2 96%   Breastfeeding Unknown   BMI 53.59 kg/m²     Temp (24hrs), Av.3 °F (36.8 °C), Min:97.6 °F (36.4 °C), Max:98.7 °F (37.1 °C)        Exam:        Patient without distress. Abdomen, bowel sounds present, soft, expected tenderness, fundus firm                Wound incision clean, dry and intact               Lower extremities are negative for swelling, cords or tenderness. Labs:   Lab Results   Component Value Date/Time    WBC 16.0 (H) 2021 04:03 AM    WBC 14.4 (H) 2021 05:07 PM    WBC 7.4 2013 10:48 AM    HGB 8.8 (L) 2021 04:03 AM    HGB 11.6 2021 05:07 PM    HGB 13.6 2013 10:48 AM    HCT 28.7 (L) 2021 04:03 AM    HCT 35.8 2021 05:07 PM    HCT 39.0 2013 10:48 AM    PLATELET 295  04:03 AM    PLATELET 058  05:07 PM    PLATELET 060 4225 10:48 AM       No results found for this or any previous visit (from the past 24 hour(s)).

## 2021-05-10 NOTE — DISCHARGE SUMMARY
Obstetrical Discharge Summary     Name: Jessica Henderson MRN: [id number]  SSN: [id number]    YOB: 1995  Age: 31 y.o. Sex: female      Admit Date: 2021    Discharge Date: 5/10/2021     Admitting Physician: Vaughn Contreras MD     Attending Physician:  Shruthi Bravo MD     Admission Diagnoses: Term pregnancy [Z34.90]    Discharge Diagnoses:   Information for the patient's :  Maricruz Verduzco [[id number]]   Delivery of a 3.305 kg female infant via , Low Transverse on 2021 at 9:50 AM  by Yang Messina. Apgars were 9  and 9 . Additional Diagnoses:   Hospital Problems  Date Reviewed: 2018          Codes Class Noted POA    Term pregnancy ICD-10-CM: Z34.90  ICD-9-CM: V22.1  2021 Unknown             Lab Results   Component Value Date/Time    Rubella, External Immune 10/14/2020    GrBStrep, External Negative 2021       Hospital Course: Normal hospital course following the delivery. Disposition at Discharge: Home or self care    Discharged Condition: Stable    Patient Instructions:   Current Discharge Medication List      START taking these medications    Details   docusate sodium (COLACE) 100 mg capsule Take 1 Cap by mouth two (2) times a day for 90 days. Qty: 60 Cap, Refills: 2      ibuprofen (MOTRIN) 800 mg tablet Take 1 Tab by mouth every eight (8) hours as needed for Pain. Qty: 60 Tab, Refills: 0      HYDROcodone-acetaminophen (NORCO) 5-325 mg per tablet Take 1 Tab by mouth every four (4) hours as needed (severe pain) for up to 3 days. Max Daily Amount: 6 Tabs. Qty: 20 Tab, Refills: 0    Associated Diagnoses: S/P          CONTINUE these medications which have NOT CHANGED    Details   PNV Comb #2-Iron-FA-Omega 3 29-1-400 mg cmpk Take  by mouth.          STOP taking these medications       aspirin 81 mg chewable tablet Comments:   Reason for Stopping:         norethindrone-ethinyl estradiol (NECON 0.5/35, 28,) 0.5-35 mg-mcg tab Comments:   Reason for Stopping:               Reference my discharge instructions. Follow-up Appointments   Procedures    FOLLOW UP VISIT Appointment in: Two Weeks     Standing Status:   Standing     Number of Occurrences:   1     Order Specific Question:   Appointment in     Answer:    Two Weeks        Signed By:  Josiah Chatterjee MD     May 10, 2021

## 2021-05-10 NOTE — LACTATION NOTE
This note was copied from a baby's chart. Pt will successfully establish breastfeeding by feeding in response to early feeding cues   or wake every 3h, will obtain deep latch, and will keep log of feedings/output. Taught to BF at hunger cues and or q 2-3 hrs and to offer 10-20 drops of hand expressed colostrum at any non-feeds. Breast Assessment  Left Breast: Extra large  Left Nipple: Everted, Intact, Tender  Right Breast: Extra large  Right Nipple: Everted, Intact, Tender  Breast- Feeding Assessment  Attends Breast-Feeding Classes: Yes  Breast-Feeding Experience: No  Breast Trauma/Surgery: No  Type/Quality: Good  Lactation Consultant Visits  Breast-Feedings: Good   Mother/Infant Observation  Mother Observation: Alignment, Breast comfortable, Close hold  Infant Observation: Latches nipple and aereolae, Lips flanged, lower, Lips flanged, upper, Opens mouth  LATCH Documentation  Latch: Grasps breast, tongue down, lips flanged, rhythmic sucking  Audible Swallowing: A few with stimulation  Type of Nipple: Everted (after stimulation)  Comfort (Breast/Nipple): Soft/non-tender  Hold (Positioning): Full assist, teach one side, mother does other, staff holds  [facility] Score: 8    Baby fed well during [facility] visit, strong suck noted, baby fussy, attempted to burp and change sides, latched again to left side easily fed for 11 minutes. Chart shows numerous feedings, void, stool WNL. Discussed importance of monitoring outputs and feedings on first week of life. Discussed ways to tell if baby is  getting enough breast milk, ie  voids and stools, change in color of stool, and return to birth wt within 2 weeks. Follow up with pediatrician visit for weight check in 1-2 days (per AAP guidelines.)  Encouraged to call Warm Line  [phone]  for any questions/problems that arise. Mother also given breastfeeding support group dates and times for any future needs.

## 2021-05-10 NOTE — DISCHARGE INSTRUCTIONS
POST DELIVERY DISCHARGE INSTRUCTIONS    Name: Herve Khan  YOB: 1995  Primary Diagnosis: Active Problems:    Term pregnancy (2021)        General:     Diet/Diet Restrictions:  Eight 8-ounce glasses of fluid daily (water, juices); avoid excessive caffeine intake. Meals/snacks as desired which are high in fiber and carbohydrates and low in fat and cholesterol. Physical Activity / Restrictions / Safety:     Avoid heavy lifting, no more that 8 lbs. For 2-3 weeks; limit use of stairs to 2 times daily for the first week home. No driving for one week. Avoid intercourse 4-6 weeks, no douching or tampon use. Check with obstetrician before starting or resuming an exercise program.         Discharge Instructions/Special Treatment/Home Care Needs:     Continue prenatal vitamins. Continue to use squirt bottle with warm water on your episiotomy after each bathroom use until bleeding stops. If steri-strips applied to your incision, remove in 7-10 days. Call your doctor for the following:     Fever over 101 degrees by mouth. Vaginal bleeding heavier than a normal menstrual period or clot larger than a golf ball. Red streaks or increased swelling of legs, painful red streaks on your breast.  Painful urination, constipation and increased pain or swelling or discharge with your incision. If you feel extremely anxious or overwhelmed. If you have thoughts of harming yourself and/or your baby. Pain Management:     Pain Management:   Take Acetaminophen (Tylenol) or Ibuprofen (Advil, Motrin), as directed for pain. Use a warm Sitz bath 3 times daily to relieve episiotomy or hemorrhoidal discomfort. Heating pad to  incision as needed. For hemorrhoidal discomfort, use Tucks and Anusol cream as needed and directed. Follow-Up Care:      These are general instructions for a healthy lifestyle:    No smoking/ No tobacco products/ Avoid exposure to second hand smoke    Surgeon Cinda Varela Warning:  Quitting smoking now greatly reduces serious risk to your health. Obesity, smoking, and sedentary lifestyle greatly increases your risk for illness    A healthy diet, regular physical exercise & weight monitoring are important for maintaining a healthy lifestyle    Recognize signs and symptoms of STROKE:    F-face looks uneven    A-arms unable to move or move unevenly    S-speech slurred or non-existent    T-time-call 911 as soon as signs and symptoms begin-DO NOT go       Back to bed or wait to see if you get better-TIME IS BRAIN. Patient Education         Section: What to Expect at Home  Your Recovery     A  section, or , is surgery to deliver your baby through a cut that the doctor makes in your lower belly and uterus. The cut is called an incision. You may have some pain in your lower belly and need pain medicine for 1 to 2 weeks. You can expect some vaginal bleeding for several weeks. You will probably need about 6 weeks to fully recover. It's important to take it easy while the incision heals. Avoid heavy lifting, strenuous activities, and exercises that strain the belly muscles while you recover. Ask a family member or friend for help with housework, cooking, and shopping. This care sheet gives you a general idea about how long it will take for you to recover. But each person recovers at a different pace. Follow the steps below to get better as quickly as possible. How can you care for yourself at home? Activity    · Rest when you feel tired. Getting enough sleep will help you recover.     · Try to walk each day. Start by walking a little more than you did the day before. Bit by bit, increase the amount you walk.  Walking boosts blood flow and helps prevent pneumonia, constipation, and blood clots.     · Avoid strenuous activities, such as bicycle riding, jogging, weightlifting, and aerobic exercise, for 6 weeks or until your doctor says it is okay.     · Until your doctor says it is okay, do not lift anything heavier than your baby.     · Do not do sit-ups or other exercises that strain the belly muscles for 6 weeks or until your doctor says it is okay.     · Hold a pillow over your incision when you cough or take deep breaths. This will support your belly and decrease your pain.     · You may shower as usual. Pat the incision dry when you are done.     · You will have some vaginal bleeding. Wear sanitary pads. Do not douche or use tampons until your doctor says it is okay.     · Ask your doctor when you can drive again.     · You will probably need to take at least 6 weeks off work. It depends on the type of work you do and how you feel.     · Ask your doctor when it is okay for you to have sex. Diet    · You can eat your normal diet. If your stomach is upset, try bland, low-fat foods like plain rice, broiled chicken, toast, and yogurt.     · Drink plenty of fluids (unless your doctor tells you not to).     · You may notice that your bowel movements are not regular right after your surgery. This is common. Try to avoid constipation and straining with bowel movements. You may want to take a fiber supplement every day. If you have not had a bowel movement after a couple of days, ask your doctor about taking a mild laxative.     · If you are breastfeeding, limit alcohol. Alcohol can cause a lack of energy and other health problems for the baby when a breastfeeding woman drinks heavily. It can also get in the way of a mom's ability to feed her baby or to care for the child in other ways. There isn't a lot of research about exactly how much alcohol can harm a baby. Having no alcohol is the safest choice for your baby. If you choose to have a drink now and then, have only one drink, and limit the number of occasions that you have a drink. Wait to breastfeed at least 2 hours after you have a drink to reduce the amount of alcohol the baby may get in the milk.    Medicines    · Your doctor will tell you if and when you can restart your medicines. He or she will also give you instructions about taking any new medicines.     · If you take aspirin or some other blood thinner, ask your doctor if and when to start taking it again. Make sure that you understand exactly what your doctor wants you to do.     · Take pain medicines exactly as directed. ? If the doctor gave you a prescription medicine for pain, take it as prescribed. ? If you are not taking a prescription pain medicine, ask your doctor if you can take an over-the-counter medicine.     · If you think your pain medicine is making you sick to your stomach:  ? Take your medicine after meals (unless your doctor has told you not to). ? Ask your doctor for a different pain medicine.     · If your doctor prescribed antibiotics, take them as directed. Do not stop taking them just because you feel better. You need to take the full course of antibiotics. Incision care    · If you have strips of tape on the incision, leave the tape on for a week or until it falls off.     · Wash the area daily with warm, soapy water, and pat it dry. Don't use hydrogen peroxide or alcohol, which can slow healing. You may cover the area with a gauze bandage if it weeps or rubs against clothing. Change the bandage every day.     · Keep the area clean and dry. Other instructions    · If you breastfeed your baby, you may be more comfortable while you are healing if you place the baby so that he or she is not resting on your belly. Try tucking your baby under your arm, with his or her body along the side you will be feeding on. Support your baby's upper body with your arm. With that hand you can control your baby's head to bring his or her mouth to your breast. This is sometimes called the football hold. Follow-up care is a key part of your treatment and safety. Be sure to make and go to all appointments, and call your doctor if you are having problems. It's also a good idea to know your test results and keep a list of the medicines you take. When should you call for help? Call  911 anytime you think you may need emergency care. For example, call if:    · You have thoughts of harming yourself, your baby, or another person.     · You passed out (lost consciousness).     · You have chest pain, are short of breath, or cough up blood.     · You have a seizure. Call your doctor now or seek immediate medical care if:    · You have pain that does not get better after you take pain medicine.     · You have severe vaginal bleeding.     · You are dizzy or lightheaded, or you feel like you may faint.     · You have new or worse pain in your belly or pelvis.     · You have loose stitches, or your incision comes open.     · You have symptoms of infection, such as:  ? Increased pain, swelling, warmth, or redness. ? Red streaks leading from the incision. ? Pus draining from the incision. ? A fever.     · You have symptoms of a blood clot in your leg (called a deep vein thrombosis), such as:  ? Pain in your calf, back of the knee, thigh, or groin. ? Redness and swelling in your leg or groin.     · You have signs of preeclampsia, such as:  ? Sudden swelling of your face, hands, or feet. ? New vision problems (such as dimness, blurring, or seeing spots). ? A severe headache. Watch closely for changes in your health, and be sure to contact your doctor if:    · You do not get better as expected. Where can you learn more? Go to http://www.[employer].com/  Enter M806 in the search box to learn more about \" Section: What to Expect at Home. \"  Current as of: 2020               Content Version: 12.8   [employer]. Care instructions adapted under license by [employer] (which disclaims liability or warranty for this information).  If you have questions about a medical condition or this instruction, always ask your healthcare professional. [place] [address] any warranty or liability for your use of this information.

## 2021-05-10 NOTE — ROUTINE PROCESS
Bedside shift change report given to Noris Pennington RN (oncoming nurse) by Francheska Tanner RN (offgoing nurse). Report included the following information SBAR, Kardex and MAR.

## 2022-03-18 PROBLEM — E66.01 OBESITY, MORBID (HCC): Status: ACTIVE | Noted: 2018-09-27

## 2022-03-19 PROBLEM — Z34.90 TERM PREGNANCY: Status: ACTIVE | Noted: 2021-05-06

## 2023-05-20 RX ORDER — IBUPROFEN 800 MG/1
800 TABLET ORAL EVERY 8 HOURS PRN
COMMUNITY
Start: 2021-05-10

## 2024-11-19 LAB
C. TRACHOMATIS, EXTERNAL RESULT: NEGATIVE
HEP B, EXTERNAL RESULT: NORMAL
HEPATITIS C ANTIBODY, EXTERNAL RESULT: NORMAL
HIV, EXTERNAL RESULT: NORMAL
N. GONORRHOEAE, EXTERNAL RESULT: NEGATIVE
RUBELLA TITER, EXTERNAL RESULT: NORMAL
T. PALLIDUM (SYPHILIS) ANTIBODY, EXTERNAL RESULT: NORMAL

## 2025-03-30 ENCOUNTER — ANESTHESIA (OUTPATIENT)
Facility: HOSPITAL | Age: 30
End: 2025-03-30

## 2025-03-30 ENCOUNTER — ANESTHESIA EVENT (OUTPATIENT)
Facility: HOSPITAL | Age: 30
End: 2025-03-30

## 2025-03-30 ENCOUNTER — HOSPITAL ENCOUNTER (OUTPATIENT)
Facility: HOSPITAL | Age: 30
Discharge: HOME | DRG: 833 | End: 2025-03-31
Attending: OBSTETRICS & GYNECOLOGY | Admitting: OBSTETRICS & GYNECOLOGY
Payer: OTHER GOVERNMENT

## 2025-03-30 LAB
APPEARANCE UR: ABNORMAL
BACTERIA URNS QL MICRO: ABNORMAL /HPF
BILIRUB UR QL: NEGATIVE
COLOR UR: ABNORMAL
EPITH CASTS URNS QL MICRO: ABNORMAL /LPF
GLUCOSE UR STRIP.AUTO-MCNC: NEGATIVE MG/DL
HGB UR QL STRIP: ABNORMAL
HYALINE CASTS URNS QL MICRO: ABNORMAL /LPF (ref 0–5)
KETONES UR QL STRIP.AUTO: NEGATIVE MG/DL
LEUKOCYTE ESTERASE UR QL STRIP.AUTO: ABNORMAL
NITRITE UR QL STRIP.AUTO: NEGATIVE
PH UR STRIP: 7.5 (ref 5–8)
PROT UR STRIP-MCNC: NEGATIVE MG/DL
RBC #/AREA URNS HPF: ABNORMAL /HPF (ref 0–5)
SP GR UR REFRACTOMETRY: 1.01 (ref 1–1.03)
URINE CULTURE IF INDICATED: ABNORMAL
UROBILINOGEN UR QL STRIP.AUTO: 0.2 EU/DL (ref 0.2–1)
WBC URNS QL MICRO: >100 /HPF (ref 0–4)

## 2025-03-30 PROCEDURE — 81001 URINALYSIS AUTO W/SCOPE: CPT

## 2025-03-30 PROCEDURE — 87086 URINE CULTURE/COLONY COUNT: CPT

## 2025-03-30 PROCEDURE — 4A1HXCZ MONITORING OF PRODUCTS OF CONCEPTION, CARDIAC RATE, EXTERNAL APPROACH: ICD-10-PCS | Performed by: OBSTETRICS & GYNECOLOGY

## 2025-03-30 PROCEDURE — G0378 HOSPITAL OBSERVATION PER HR: HCPCS

## 2025-03-30 PROCEDURE — G0379 DIRECT REFER HOSPITAL OBSERV: HCPCS

## 2025-03-30 RX ORDER — NITROFURANTOIN 25; 75 MG/1; MG/1
100 CAPSULE ORAL 2 TIMES DAILY
Qty: 12 CAPSULE | Refills: 0 | Status: ON HOLD | OUTPATIENT
Start: 2025-03-30 | End: 2025-04-04 | Stop reason: HOSPADM

## 2025-03-30 RX ORDER — NITROFURANTOIN 25; 75 MG/1; MG/1
200 CAPSULE ORAL ONCE
Status: COMPLETED | OUTPATIENT
Start: 2025-03-31 | End: 2025-03-31

## 2025-03-31 VITALS
OXYGEN SATURATION: 99 % | DIASTOLIC BLOOD PRESSURE: 77 MMHG | HEART RATE: 73 BPM | SYSTOLIC BLOOD PRESSURE: 130 MMHG | TEMPERATURE: 98 F | RESPIRATION RATE: 18 BRPM

## 2025-03-31 PROCEDURE — 6370000000 HC RX 637 (ALT 250 FOR IP): Performed by: OBSTETRICS & GYNECOLOGY

## 2025-03-31 RX ADMIN — NITROFURANTOIN MONOHYDRATE/MACROCRYSTALS 200 MG: 75; 25 CAPSULE ORAL at 00:07

## 2025-03-31 NOTE — PROGRESS NOTES
2245: Patient arrived ambulatory to floor, c/o pain in lower abdomen and what she believes to be cramping.  Patient placed on EFM and Uc's present, unable to palpate.  Pt also endorses frequent urination.  2300: MD updated - see orders   2330: Patient orally hydrating well, drank 3 cups while waiting for labs  2345: labs back, MD notified  2355: MD at bedside discussing plan for UTI with patient.   0010: Reviewed d/c paperwork with patient, medication given and pt verbalized understanding - ambulated off unit with spouse.

## 2025-03-31 NOTE — ED NOTES
AntePartum High Risk Pregnancy Note    Kassidy Rondon  Unknown    HPI: This is a 30 y/o F , previous , morbid obesity, presents with frequency and cramping for the last 2 days. Denies contractions, PROM, vaginal bleeding or hematuria. Prenatal care has been uncomplicated with [facility].    ROS-See HPI    PX:  Vitals:  Patient Vitals for the past 24 hrs:   BP Temp Temp src Pulse Resp SpO2   25 2251 129/69 -- -- 91 18 99 %   25 2246 -- -- -- -- -- 99 %   25 2241 -- -- -- -- -- 98 %   25 2236 135/68 99.2 °F (37.3 °C) Oral 91 20 98 %     Temp (24hrs), Av.2 °F (37.3 °C), Min:99.2 °F (37.3 °C), Max:99.2 °F (37.3 °C)    I&O:   No intake/output data recorded.             No intake/output data recorded.    Exam:  Patient without distress.               Abdomen soft, non-tender               Fundus soft and non tender               Right upper quadrant non-tender               Perineum No sign of blood or amniotic fluid               Lower extremities edema No                Uterine Activity: None               NST:  Reactive           Labs:   Recent Results (from the past 24 hours)   Urinalysis with Reflex to Culture    Collection Time: 25 11:13 PM    Specimen: Urine   Result Value Ref Range    Color, UA YELLOW/STRAW      Appearance CLOUDY (A) CLEAR      Specific Gravity, UA 1.011 1.003 - 1.030      pH, Urine 7.5 5.0 - 8.0      Protein, UA Negative NEG mg/dL    Glucose, Ur Negative NEG mg/dL    Ketones, Urine Negative NEG mg/dL    Bilirubin, Urine Negative NEG      Blood, Urine MODERATE (A) NEG      Urobilinogen, Urine 0.2 0.2 - 1.0 EU/dL    Nitrite, Urine Negative NEG      Leukocyte Esterase, Urine LARGE (A) NEG      WBC, UA >100 (H) 0 - 4 /hpf    RBC, UA 0-5 0 - 5 /hpf    Epithelial Cells, UA MANY (A) FEW /lpf    BACTERIA, URINE 2+ (A) NEG /hpf    Urine Culture if Indicated URINE CULTURE ORDERED (A) CNI      Hyaline Casts, UA 0-2 0 - 5 /lpf

## 2025-04-01 ENCOUNTER — HOSPITAL ENCOUNTER (INPATIENT)
Facility: HOSPITAL | Age: 30
LOS: 1 days | Discharge: ANOTHER HOSPITAL | DRG: 833 | End: 2025-04-02
Attending: OBSTETRICS & GYNECOLOGY | Admitting: OBSTETRICS & GYNECOLOGY
Payer: OTHER GOVERNMENT

## 2025-04-01 LAB
APPEARANCE UR: CLEAR
BACTERIA SPEC CULT: NORMAL
BACTERIA URNS QL MICRO: NEGATIVE /HPF
BILIRUB UR QL: NEGATIVE
CC UR VC: NORMAL
COLOR UR: ABNORMAL
EPITH CASTS URNS QL MICRO: ABNORMAL /LPF
GLUCOSE UR STRIP.AUTO-MCNC: NEGATIVE MG/DL
HGB UR QL STRIP: NEGATIVE
KETONES UR QL STRIP.AUTO: NEGATIVE MG/DL
LEUKOCYTE ESTERASE UR QL STRIP.AUTO: ABNORMAL
NITRITE UR QL STRIP.AUTO: NEGATIVE
PH UR STRIP: 7 (ref 5–8)
PROT UR STRIP-MCNC: NEGATIVE MG/DL
RBC #/AREA URNS HPF: ABNORMAL /HPF (ref 0–5)
SERVICE CMNT-IMP: NORMAL
SP GR UR REFRACTOMETRY: 1.01 (ref 1–1.03)
URINE CULTURE IF INDICATED: ABNORMAL
UROBILINOGEN UR QL STRIP.AUTO: 0.2 EU/DL (ref 0.2–1)
WBC URNS QL MICRO: ABNORMAL /HPF (ref 0–4)

## 2025-04-01 PROCEDURE — 87086 URINE CULTURE/COLONY COUNT: CPT

## 2025-04-01 PROCEDURE — 81001 URINALYSIS AUTO W/SCOPE: CPT

## 2025-04-01 PROCEDURE — G0379 DIRECT REFER HOSPITAL OBSERV: HCPCS

## 2025-04-01 PROCEDURE — 6360000002 HC RX W HCPCS: Performed by: OBSTETRICS & GYNECOLOGY

## 2025-04-01 PROCEDURE — G0378 HOSPITAL OBSERVATION PER HR: HCPCS

## 2025-04-01 PROCEDURE — 2580000003 HC RX 258: Performed by: OBSTETRICS & GYNECOLOGY

## 2025-04-01 RX ORDER — TERBUTALINE SULFATE 1 MG/ML
0.25 INJECTION SUBCUTANEOUS ONCE
Status: COMPLETED | OUTPATIENT
Start: 2025-04-01 | End: 2025-04-01

## 2025-04-01 RX ORDER — HYDROMORPHONE HYDROCHLORIDE 1 MG/ML
1 INJECTION, SOLUTION INTRAMUSCULAR; INTRAVENOUS; SUBCUTANEOUS ONCE
Status: COMPLETED | OUTPATIENT
Start: 2025-04-01 | End: 2025-04-01

## 2025-04-01 RX ORDER — SODIUM CHLORIDE, SODIUM LACTATE, POTASSIUM CHLORIDE, CALCIUM CHLORIDE 600; 310; 30; 20 MG/100ML; MG/100ML; MG/100ML; MG/100ML
INJECTION, SOLUTION INTRAVENOUS CONTINUOUS
Status: DISCONTINUED | OUTPATIENT
Start: 2025-04-01 | End: 2025-04-02 | Stop reason: HOSPADM

## 2025-04-01 RX ADMIN — SODIUM CHLORIDE, SODIUM LACTATE, POTASSIUM CHLORIDE, AND CALCIUM CHLORIDE: .6; .31; .03; .02 INJECTION, SOLUTION INTRAVENOUS at 21:27

## 2025-04-01 RX ADMIN — HYDROMORPHONE HYDROCHLORIDE 1 MG: 1 INJECTION, SOLUTION INTRAMUSCULAR; INTRAVENOUS; SUBCUTANEOUS at 22:08

## 2025-04-01 RX ADMIN — TERBUTALINE SULFATE 0.25 MG: 1 INJECTION, SOLUTION SUBCUTANEOUS at 23:29

## 2025-04-01 ASSESSMENT — PAIN DESCRIPTION - DESCRIPTORS: DESCRIPTORS: CRAMPING

## 2025-04-01 ASSESSMENT — PAIN DESCRIPTION - LOCATION: LOCATION: ABDOMEN

## 2025-04-01 ASSESSMENT — PAIN SCALES - GENERAL: PAINLEVEL_OUTOF10: 7

## 2025-04-02 ENCOUNTER — HOSPITAL ENCOUNTER (INPATIENT)
Facility: HOSPITAL | Age: 30
LOS: 2 days | Discharge: HOME | End: 2025-04-04
Attending: OBSTETRICS & GYNECOLOGY | Admitting: OBSTETRICS & GYNECOLOGY
Payer: OTHER GOVERNMENT

## 2025-04-02 ENCOUNTER — ANESTHESIA (OUTPATIENT)
Facility: HOSPITAL | Age: 30
End: 2025-04-02
Payer: OTHER GOVERNMENT

## 2025-04-02 ENCOUNTER — ANESTHESIA EVENT (OUTPATIENT)
Facility: HOSPITAL | Age: 30
End: 2025-04-02
Payer: OTHER GOVERNMENT

## 2025-04-02 VITALS
SYSTOLIC BLOOD PRESSURE: 118 MMHG | DIASTOLIC BLOOD PRESSURE: 87 MMHG | RESPIRATION RATE: 16 BRPM | OXYGEN SATURATION: 92 % | WEIGHT: 293 LBS | TEMPERATURE: 97.9 F | BODY MASS INDEX: 47.09 KG/M2 | HEIGHT: 66 IN | HEART RATE: 95 BPM

## 2025-04-02 PROBLEM — Z3A.27 27 WEEKS GESTATION OF PREGNANCY: Status: ACTIVE | Noted: 2025-04-02

## 2025-04-02 LAB
ABO + RH BLD: NORMAL
ALBUMIN SERPL-MCNC: 2.9 G/DL (ref 3.5–5)
ALBUMIN/GLOB SERPL: 0.9 (ref 1.1–2.2)
ALP SERPL-CCNC: 73 U/L (ref 45–117)
ALT SERPL-CCNC: 15 U/L (ref 12–78)
ANION GAP SERPL CALC-SCNC: 9 MMOL/L (ref 2–12)
AST SERPL-CCNC: 10 U/L (ref 15–37)
BASOPHILS # BLD: 0.05 K/UL (ref 0–0.1)
BASOPHILS NFR BLD: 0.3 % (ref 0–1)
BILIRUB SERPL-MCNC: 0.2 MG/DL (ref 0.2–1)
BLOOD GROUP ANTIBODIES SERPL: NORMAL
BUN SERPL-MCNC: 5 MG/DL (ref 6–20)
BUN/CREAT SERPL: 11 (ref 12–20)
CALCIUM SERPL-MCNC: 8.5 MG/DL (ref 8.5–10.1)
CHLORIDE SERPL-SCNC: 106 MMOL/L (ref 97–108)
CO2 SERPL-SCNC: 22 MMOL/L (ref 21–32)
CREAT SERPL-MCNC: 0.45 MG/DL (ref 0.55–1.02)
DIFFERENTIAL METHOD BLD: ABNORMAL
EOSINOPHIL # BLD: 0.16 K/UL (ref 0–0.4)
EOSINOPHIL NFR BLD: 1 % (ref 0–7)
ERYTHROCYTE [DISTWIDTH] IN BLOOD BY AUTOMATED COUNT: 14.2 % (ref 11.5–14.5)
GLOBULIN SER CALC-MCNC: 3.3 G/DL (ref 2–4)
GLUCOSE SERPL-MCNC: 83 MG/DL (ref 65–100)
HCT VFR BLD AUTO: 33.7 % (ref 35–47)
HGB BLD-MCNC: 10.9 G/DL (ref 11.5–16)
IMM GRANULOCYTES # BLD AUTO: 0.05 K/UL (ref 0–0.04)
IMM GRANULOCYTES NFR BLD AUTO: 0.3 % (ref 0–0.5)
LYMPHOCYTES # BLD: 2.36 K/UL (ref 0.8–3.5)
LYMPHOCYTES NFR BLD: 14.5 % (ref 12–49)
MCH RBC QN AUTO: 28.3 PG (ref 26–34)
MCHC RBC AUTO-ENTMCNC: 32.3 G/DL (ref 30–36.5)
MCV RBC AUTO: 87.5 FL (ref 80–99)
MONOCYTES # BLD: 0.84 K/UL (ref 0–1)
MONOCYTES NFR BLD: 5.2 % (ref 5–13)
NEUTS SEG # BLD: 12.76 K/UL (ref 1.8–8)
NEUTS SEG NFR BLD: 78.7 % (ref 32–75)
NRBC # BLD: 0 K/UL (ref 0–0.01)
NRBC BLD-RTO: 0 PER 100 WBC
PLATELET # BLD AUTO: 280 K/UL (ref 150–400)
PMV BLD AUTO: 11.6 FL (ref 8.9–12.9)
POTASSIUM SERPL-SCNC: 3.8 MMOL/L (ref 3.5–5.1)
PROT SERPL-MCNC: 6.2 G/DL (ref 6.4–8.2)
RBC # BLD AUTO: 3.85 M/UL (ref 3.8–5.2)
SODIUM SERPL-SCNC: 137 MMOL/L (ref 136–145)
SPECIMEN EXP DATE BLD: NORMAL
WBC # BLD AUTO: 16.2 K/UL (ref 3.6–11)

## 2025-04-02 PROCEDURE — 1100000000 HC RM PRIVATE

## 2025-04-02 PROCEDURE — 1120000000 HC RM PRIVATE OB

## 2025-04-02 PROCEDURE — 2500000003 HC RX 250 WO HCPCS: Performed by: SURGERY

## 2025-04-02 PROCEDURE — 51702 INSERT TEMP BLADDER CATH: CPT

## 2025-04-02 PROCEDURE — 99214 OFFICE O/P EST MOD 30 MIN: CPT

## 2025-04-02 PROCEDURE — 86900 BLOOD TYPING SEROLOGIC ABO: CPT

## 2025-04-02 PROCEDURE — 7210000100 HC LABOR FEE PER 1 HR

## 2025-04-02 PROCEDURE — 6370000000 HC RX 637 (ALT 250 FOR IP): Performed by: OBSTETRICS & GYNECOLOGY

## 2025-04-02 PROCEDURE — 99465 NB RESUSCITATION: CPT

## 2025-04-02 PROCEDURE — 4A1HXCZ MONITORING OF PRODUCTS OF CONCEPTION, CARDIAC RATE, EXTERNAL APPROACH: ICD-10-PCS | Performed by: OBSTETRICS & GYNECOLOGY

## 2025-04-02 PROCEDURE — 2580000003 HC RX 258: Performed by: OBSTETRICS & GYNECOLOGY

## 2025-04-02 PROCEDURE — 6360000002 HC RX W HCPCS: Performed by: SURGERY

## 2025-04-02 PROCEDURE — 85025 COMPLETE CBC W/AUTO DIFF WBC: CPT

## 2025-04-02 PROCEDURE — 6360000002 HC RX W HCPCS: Performed by: OBSTETRICS & GYNECOLOGY

## 2025-04-02 PROCEDURE — 7220000101 HC DELIVERY VAGINAL/SINGLE

## 2025-04-02 PROCEDURE — 88307 TISSUE EXAM BY PATHOLOGIST: CPT

## 2025-04-02 PROCEDURE — 86850 RBC ANTIBODY SCREEN: CPT

## 2025-04-02 PROCEDURE — 80053 COMPREHEN METABOLIC PANEL: CPT

## 2025-04-02 PROCEDURE — 2500000003 HC RX 250 WO HCPCS: Performed by: OBSTETRICS & GYNECOLOGY

## 2025-04-02 PROCEDURE — 86901 BLOOD TYPING SEROLOGIC RH(D): CPT

## 2025-04-02 PROCEDURE — 7100000001 HC PACU RECOVERY - ADDTL 15 MIN

## 2025-04-02 PROCEDURE — 3700000025 EPIDURAL BLOCK: Performed by: ANESTHESIOLOGY

## 2025-04-02 PROCEDURE — 36415 COLL VENOUS BLD VENIPUNCTURE: CPT

## 2025-04-02 PROCEDURE — 7100000000 HC PACU RECOVERY - FIRST 15 MIN

## 2025-04-02 PROCEDURE — 87081 CULTURE SCREEN ONLY: CPT

## 2025-04-02 PROCEDURE — G0378 HOSPITAL OBSERVATION PER HR: HCPCS

## 2025-04-02 RX ORDER — ONDANSETRON 4 MG/1
4 TABLET, ORALLY DISINTEGRATING ORAL EVERY 6 HOURS PRN
Status: DISCONTINUED | OUTPATIENT
Start: 2025-04-02 | End: 2025-04-04 | Stop reason: HOSPADM

## 2025-04-02 RX ORDER — BUPIVACAINE HYDROCHLORIDE 2.5 MG/ML
INJECTION, SOLUTION EPIDURAL; INFILTRATION; INTRACAUDAL; PERINEURAL
Status: DISCONTINUED | OUTPATIENT
Start: 2025-04-02 | End: 2025-04-02 | Stop reason: SDUPTHER

## 2025-04-02 RX ORDER — BETAMETHASONE SODIUM PHOSPHATE AND BETAMETHASONE ACETATE 3; 3 MG/ML; MG/ML
12 INJECTION, SUSPENSION INTRA-ARTICULAR; INTRALESIONAL; INTRAMUSCULAR; SOFT TISSUE EVERY 24 HOURS
Status: DISCONTINUED | OUTPATIENT
Start: 2025-04-02 | End: 2025-04-02

## 2025-04-02 RX ORDER — EPHEDRINE SULFATE 50 MG/ML
10 INJECTION INTRAVENOUS
Status: DISCONTINUED | OUTPATIENT
Start: 2025-04-02 | End: 2025-04-02

## 2025-04-02 RX ORDER — ONDANSETRON 2 MG/ML
4 INJECTION INTRAMUSCULAR; INTRAVENOUS EVERY 6 HOURS PRN
Status: DISCONTINUED | OUTPATIENT
Start: 2025-04-02 | End: 2025-04-02 | Stop reason: HOSPADM

## 2025-04-02 RX ORDER — ONDANSETRON 2 MG/ML
4 INJECTION INTRAMUSCULAR; INTRAVENOUS EVERY 6 HOURS PRN
Status: DISCONTINUED | OUTPATIENT
Start: 2025-04-02 | End: 2025-04-04 | Stop reason: HOSPADM

## 2025-04-02 RX ORDER — LIDOCAINE HCL/EPINEPHRINE/PF 2%-1:200K
VIAL (ML) INJECTION
Status: DISCONTINUED | OUTPATIENT
Start: 2025-04-02 | End: 2025-04-02 | Stop reason: SDUPTHER

## 2025-04-02 RX ORDER — ONDANSETRON 2 MG/ML
4 INJECTION INTRAMUSCULAR; INTRAVENOUS EVERY 6 HOURS PRN
Status: DISCONTINUED | OUTPATIENT
Start: 2025-04-02 | End: 2025-04-02 | Stop reason: SDUPTHER

## 2025-04-02 RX ORDER — SODIUM CHLORIDE 9 MG/ML
INJECTION, SOLUTION INTRAVENOUS PRN
Status: DISCONTINUED | OUTPATIENT
Start: 2025-04-02 | End: 2025-04-04 | Stop reason: HOSPADM

## 2025-04-02 RX ORDER — CLINDAMYCIN PHOSPHATE 900 MG/50ML
900 INJECTION, SOLUTION INTRAVENOUS ONCE
Status: COMPLETED | OUTPATIENT
Start: 2025-04-02 | End: 2025-04-02

## 2025-04-02 RX ORDER — CALCIUM GLUCONATE 94 MG/ML
1000 INJECTION, SOLUTION INTRAVENOUS PRN
Status: DISCONTINUED | OUTPATIENT
Start: 2025-04-02 | End: 2025-04-02

## 2025-04-02 RX ORDER — MAGNESIUM SULFATE HEPTAHYDRATE 40 MG/ML
4000 INJECTION, SOLUTION INTRAVENOUS ONCE
Status: COMPLETED | OUTPATIENT
Start: 2025-04-02 | End: 2025-04-02

## 2025-04-02 RX ORDER — ACETAMINOPHEN 500 MG
1000 TABLET ORAL EVERY 8 HOURS SCHEDULED
Status: DISCONTINUED | OUTPATIENT
Start: 2025-04-02 | End: 2025-04-04 | Stop reason: HOSPADM

## 2025-04-02 RX ORDER — EPHEDRINE SULFATE 50 MG/ML
INJECTION INTRAVENOUS
Status: DISCONTINUED
Start: 2025-04-02 | End: 2025-04-02

## 2025-04-02 RX ORDER — ONDANSETRON 4 MG/1
4 TABLET, ORALLY DISINTEGRATING ORAL EVERY 8 HOURS PRN
Status: DISCONTINUED | OUTPATIENT
Start: 2025-04-02 | End: 2025-04-02 | Stop reason: SDUPTHER

## 2025-04-02 RX ORDER — HYDROMORPHONE HYDROCHLORIDE 1 MG/ML
2 INJECTION, SOLUTION INTRAMUSCULAR; INTRAVENOUS; SUBCUTANEOUS EVERY 4 HOURS PRN
Status: DISCONTINUED | OUTPATIENT
Start: 2025-04-02 | End: 2025-04-02

## 2025-04-02 RX ORDER — FENTANYL CITRATE 50 UG/ML
INJECTION, SOLUTION INTRAMUSCULAR; INTRAVENOUS
Status: DISCONTINUED | OUTPATIENT
Start: 2025-04-02 | End: 2025-04-02 | Stop reason: SDUPTHER

## 2025-04-02 RX ORDER — SODIUM CHLORIDE, SODIUM LACTATE, POTASSIUM CHLORIDE, CALCIUM CHLORIDE 600; 310; 30; 20 MG/100ML; MG/100ML; MG/100ML; MG/100ML
INJECTION, SOLUTION INTRAVENOUS CONTINUOUS
Status: DISCONTINUED | OUTPATIENT
Start: 2025-04-02 | End: 2025-04-02

## 2025-04-02 RX ORDER — VANCOMYCIN 2 G/400ML
2000 INJECTION, SOLUTION INTRAVENOUS EVERY 8 HOURS
Status: DISCONTINUED | OUTPATIENT
Start: 2025-04-02 | End: 2025-04-02 | Stop reason: CLARIF

## 2025-04-02 RX ORDER — SODIUM CHLORIDE 0.9 % (FLUSH) 0.9 %
5-40 SYRINGE (ML) INJECTION EVERY 12 HOURS SCHEDULED
Status: DISCONTINUED | OUTPATIENT
Start: 2025-04-02 | End: 2025-04-04 | Stop reason: HOSPADM

## 2025-04-02 RX ORDER — FENTANYL CITRATE 50 UG/ML
INJECTION, SOLUTION INTRAMUSCULAR; INTRAVENOUS
Status: COMPLETED
Start: 2025-04-02 | End: 2025-04-02

## 2025-04-02 RX ORDER — FENTANYL CITRATE 50 UG/ML
25 INJECTION, SOLUTION INTRAMUSCULAR; INTRAVENOUS ONCE
Refills: 0 | Status: COMPLETED | OUTPATIENT
Start: 2025-04-02 | End: 2025-04-02

## 2025-04-02 RX ORDER — SODIUM CHLORIDE, SODIUM LACTATE, POTASSIUM CHLORIDE, CALCIUM CHLORIDE 600; 310; 30; 20 MG/100ML; MG/100ML; MG/100ML; MG/100ML
INJECTION, SOLUTION INTRAVENOUS CONTINUOUS
Status: DISCONTINUED | OUTPATIENT
Start: 2025-04-02 | End: 2025-04-02 | Stop reason: HOSPADM

## 2025-04-02 RX ORDER — FENTANYL CITRATE 50 UG/ML
50 INJECTION, SOLUTION INTRAMUSCULAR; INTRAVENOUS ONCE
Refills: 0 | Status: COMPLETED | OUTPATIENT
Start: 2025-04-02 | End: 2025-04-02

## 2025-04-02 RX ORDER — BETAMETHASONE SODIUM PHOSPHATE AND BETAMETHASONE ACETATE 3; 3 MG/ML; MG/ML
12 INJECTION, SUSPENSION INTRA-ARTICULAR; INTRALESIONAL; INTRAMUSCULAR; SOFT TISSUE ONCE
Status: COMPLETED | OUTPATIENT
Start: 2025-04-02 | End: 2025-04-02

## 2025-04-02 RX ORDER — CALCIUM GLUCONATE 94 MG/ML
1000 INJECTION, SOLUTION INTRAVENOUS PRN
Status: DISCONTINUED | OUTPATIENT
Start: 2025-04-02 | End: 2025-04-02 | Stop reason: HOSPADM

## 2025-04-02 RX ORDER — AZITHROMYCIN 250 MG/1
1000 TABLET, FILM COATED ORAL ONCE
Status: DISCONTINUED | OUTPATIENT
Start: 2025-04-02 | End: 2025-04-02

## 2025-04-02 RX ORDER — LIDOCAINE HCL/EPINEPHRINE/PF 2%-1:200K
VIAL (ML) INJECTION
Status: COMPLETED
Start: 2025-04-02 | End: 2025-04-02

## 2025-04-02 RX ORDER — NIFEDIPINE 10 MG/1
10 CAPSULE ORAL EVERY 6 HOURS
Status: DISCONTINUED | OUTPATIENT
Start: 2025-04-02 | End: 2025-04-02 | Stop reason: HOSPADM

## 2025-04-02 RX ORDER — SODIUM CHLORIDE 0.9 % (FLUSH) 0.9 %
5-40 SYRINGE (ML) INJECTION PRN
Status: DISCONTINUED | OUTPATIENT
Start: 2025-04-02 | End: 2025-04-04 | Stop reason: HOSPADM

## 2025-04-02 RX ORDER — ONDANSETRON 4 MG/1
4 TABLET, ORALLY DISINTEGRATING ORAL EVERY 6 HOURS PRN
Status: DISCONTINUED | OUTPATIENT
Start: 2025-04-02 | End: 2025-04-02 | Stop reason: HOSPADM

## 2025-04-02 RX ORDER — FENTANYL/BUPIVACAINE/NS/PF 2-1250MCG
1-15 PLASTIC BAG, INJECTION (ML) INJECTION CONTINUOUS
Refills: 0 | Status: DISCONTINUED | OUTPATIENT
Start: 2025-04-02 | End: 2025-04-02

## 2025-04-02 RX ORDER — HYDROMORPHONE HYDROCHLORIDE 1 MG/ML
0.5 INJECTION, SOLUTION INTRAMUSCULAR; INTRAVENOUS; SUBCUTANEOUS ONCE
Status: DISCONTINUED | OUTPATIENT
Start: 2025-04-02 | End: 2025-04-02 | Stop reason: HOSPADM

## 2025-04-02 RX ORDER — DOCUSATE SODIUM 100 MG/1
100 CAPSULE, LIQUID FILLED ORAL 2 TIMES DAILY
Status: DISCONTINUED | OUTPATIENT
Start: 2025-04-02 | End: 2025-04-04 | Stop reason: HOSPADM

## 2025-04-02 RX ORDER — NALBUPHINE HYDROCHLORIDE 10 MG/ML
5 INJECTION INTRAMUSCULAR; INTRAVENOUS; SUBCUTANEOUS EVERY 4 HOURS PRN
Status: DISCONTINUED | OUTPATIENT
Start: 2025-04-02 | End: 2025-04-02

## 2025-04-02 RX ORDER — IBUPROFEN 400 MG/1
800 TABLET, FILM COATED ORAL EVERY 8 HOURS SCHEDULED
Status: DISCONTINUED | OUTPATIENT
Start: 2025-04-02 | End: 2025-04-04 | Stop reason: HOSPADM

## 2025-04-02 RX ORDER — BUPIVACAINE HYDROCHLORIDE 2.5 MG/ML
INJECTION, SOLUTION EPIDURAL; INFILTRATION; INTRACAUDAL; PERINEURAL
Status: COMPLETED
Start: 2025-04-02 | End: 2025-04-02

## 2025-04-02 RX ORDER — NALOXONE HYDROCHLORIDE 0.4 MG/ML
INJECTION, SOLUTION INTRAMUSCULAR; INTRAVENOUS; SUBCUTANEOUS PRN
Status: DISCONTINUED | OUTPATIENT
Start: 2025-04-02 | End: 2025-04-02

## 2025-04-02 RX ORDER — NIFEDIPINE 10 MG/1
20 CAPSULE ORAL ONCE
Status: DISCONTINUED | OUTPATIENT
Start: 2025-04-02 | End: 2025-04-02

## 2025-04-02 RX ORDER — MODIFIED LANOLIN
OINTMENT (GRAM) TOPICAL PRN
Status: DISCONTINUED | OUTPATIENT
Start: 2025-04-02 | End: 2025-04-04 | Stop reason: HOSPADM

## 2025-04-02 RX ADMIN — AZITHROMYCIN MONOHYDRATE 500 MG: 500 INJECTION, POWDER, LYOPHILIZED, FOR SOLUTION INTRAVENOUS at 03:16

## 2025-04-02 RX ADMIN — DOCUSATE SODIUM 100 MG: 100 CAPSULE, LIQUID FILLED ORAL at 20:13

## 2025-04-02 RX ADMIN — ACETAMINOPHEN 1000 MG: 500 TABLET ORAL at 14:58

## 2025-04-02 RX ADMIN — ONDANSETRON 4 MG: 2 INJECTION, SOLUTION INTRAMUSCULAR; INTRAVENOUS at 03:30

## 2025-04-02 RX ADMIN — BUPIVACAINE HYDROCHLORIDE 5 ML: 2.5 INJECTION, SOLUTION EPIDURAL; INFILTRATION; INTRACAUDAL; PERINEURAL at 06:09

## 2025-04-02 RX ADMIN — MAGNESIUM SULFATE HEPTAHYDRATE 4000 MG: 40 INJECTION, SOLUTION INTRAVENOUS at 01:37

## 2025-04-02 RX ADMIN — BETAMETHASONE ACETATE AND BETAMETHASONE SODIUM PHOSPHATE 12 MG: 3; 3 INJECTION, SUSPENSION INTRA-ARTICULAR; INTRALESIONAL; INTRAMUSCULAR; SOFT TISSUE at 01:40

## 2025-04-02 RX ADMIN — LIDOCAINE HYDROCHLORIDE,EPINEPHRINE BITARTRATE 3 ML: 20; .005 INJECTION, SOLUTION EPIDURAL; INFILTRATION; INTRACAUDAL; PERINEURAL at 06:08

## 2025-04-02 RX ADMIN — VANCOMYCIN HYDROCHLORIDE 2000 MG: 10 INJECTION, POWDER, LYOPHILIZED, FOR SOLUTION INTRAVENOUS at 06:31

## 2025-04-02 RX ADMIN — HYDROMORPHONE HYDROCHLORIDE 2 MG: 1 INJECTION, SOLUTION INTRAMUSCULAR; INTRAVENOUS; SUBCUTANEOUS at 05:42

## 2025-04-02 RX ADMIN — Medication 10 ML/HR: at 06:42

## 2025-04-02 RX ADMIN — FENTANYL CITRATE 25 MCG: 0.05 INJECTION, SOLUTION INTRAMUSCULAR; INTRAVENOUS at 04:11

## 2025-04-02 RX ADMIN — FENTANYL CITRATE 100 MCG: 50 INJECTION INTRAMUSCULAR; INTRAVENOUS at 06:09

## 2025-04-02 RX ADMIN — IBUPROFEN 800 MG: 400 TABLET, FILM COATED ORAL at 14:59

## 2025-04-02 RX ADMIN — FENTANYL CITRATE 50 MCG: 0.05 INJECTION, SOLUTION INTRAMUSCULAR; INTRAVENOUS at 03:07

## 2025-04-02 RX ADMIN — CLINDAMYCIN PHOSPHATE 900 MG: 900 INJECTION, SOLUTION INTRAVENOUS at 01:57

## 2025-04-02 RX ADMIN — MAGNESIUM SULFATE HEPTAHYDRATE 2000 MG/HR: 40 INJECTION, SOLUTION INTRAVENOUS at 02:05

## 2025-04-02 RX ADMIN — GENTAMICIN SULFATE 491.2 MG: 40 INJECTION, SOLUTION INTRAMUSCULAR; INTRAVENOUS at 00:12

## 2025-04-02 RX ADMIN — MAGNESIUM SULFATE HEPTAHYDRATE 2000 MG/HR: 40 INJECTION, SOLUTION INTRAVENOUS at 05:49

## 2025-04-02 RX ADMIN — SODIUM CHLORIDE, PRESERVATIVE FREE 10 ML: 5 INJECTION INTRAVENOUS at 20:14

## 2025-04-02 RX ADMIN — DOCUSATE SODIUM 100 MG: 100 CAPSULE, LIQUID FILLED ORAL at 12:34

## 2025-04-02 ASSESSMENT — PAIN SCALES - GENERAL
PAINLEVEL_OUTOF10: 9
PAINLEVEL_OUTOF10: 9
PAINLEVEL_OUTOF10: 0
PAINLEVEL_OUTOF10: 0

## 2025-04-02 ASSESSMENT — PAIN DESCRIPTION - LOCATION
LOCATION: ABDOMEN

## 2025-04-02 ASSESSMENT — PAIN DESCRIPTION - ORIENTATION: ORIENTATION: ANTERIOR;LOWER

## 2025-04-02 ASSESSMENT — PAIN - FUNCTIONAL ASSESSMENT: PAIN_FUNCTIONAL_ASSESSMENT: PREVENTS OR INTERFERES SOME ACTIVE ACTIVITIES AND ADLS

## 2025-04-02 ASSESSMENT — PAIN DESCRIPTION - DESCRIPTORS
DESCRIPTORS: SHARP
DESCRIPTORS: ACHING;CRAMPING
DESCRIPTORS: CRAMPING

## 2025-04-02 NOTE — ANESTHESIA PROCEDURE NOTES
Epidural Block    Patient location during procedure: OB  Start time: 4/2/2025 6:00 AM  End time: 4/2/2025 6:10 AM  Reason for block: labor epidural  Staffing  Performed: anesthesiologist   Anesthesiologist: Prince Ortiz MD  Performed by: Prince Ortiz MD  Authorized by: Prince Ortiz MD    Epidural  Patient position: sitting  Prep: DuraPrep  Patient monitoring: cardiac monitor, continuous pulse ox and frequent blood pressure checks  Approach: midline  Location: L3-4  Injection technique: VALERIA saline and VALERIA air  Provider prep: mask and sterile gloves  Needle  Needle type: Tuohy   Needle gauge: 17 G  Needle length: 3.5 in  Needle insertion depth: 6 cm  Catheter type: end hole  Catheter size: 19 G  Catheter at skin depth: 10 cm  Test dose: negativeCatheter Secured: tegaderm and tape  Assessment  Hemodynamics: stable  Attempts: 1  Outcomes: uncomplicated and patient tolerated procedure well  Preanesthetic Checklist  Completed: patient identified, IV checked, site marked, risks and benefits discussed, surgical/procedural consents, equipment checked, pre-op evaluation, timeout performed, anesthesia consent given, oxygen available, monitors applied/VS acknowledged and fire risk safety assessment completed and verbalized

## 2025-04-02 NOTE — PROGRESS NOTES
0521: Bedside and Verbal shift change report given to ZACK Sutherland RN (oncoming nurse) by APPLE Broderick  (offgoing nurse). Report included the following information Nurse Handoff Report, Intake/Output, MAR, and Recent Results.      0525: Monitors applied. Pt writhing in bed with contractions.     0540: Jeanette WYNNE at bedside. SVE 3/50/-3. Pt requesting pain relief.  POC to continue magnesium, switch to vancomycin.    0550: Requesting epidural, LR bolus started    0600: Diana WYNNE at bedside to place epidural. Timeout performed. Consents reviewed and signed.     0610: Epidural procedure finished.     0611: Pt lying left side.     0655: Pt repositioned to right side lying. MAGY Carmona NNP at bedside to discuss NICU stay for [facility]    0705: NICU present in room to set up warmer.     0730: Bedside and Verbal shift change report given to JUNIOR Chambers RN (oncoming nurse) by ZACK Sutherland RN (offgoing nurse). Report included the following information Nurse Handoff Report, Intake/Output, MAR, and Recent Results.

## 2025-04-02 NOTE — ANESTHESIA PRE PROCEDURE
Component Value Date/Time     04/02/2025 01:33 AM    K 3.8 04/02/2025 01:33 AM     04/02/2025 01:33 AM    CO2 22 04/02/2025 01:33 AM    BUN 5 04/02/2025 01:33 AM    CREATININE 0.45 04/02/2025 01:33 AM    GFRAA >60 05/06/2021 05:07 PM    AGRATIO 0.8 05/06/2021 05:07 PM    LABGLOM >90 04/02/2025 01:33 AM    GLUCOSE 83 04/02/2025 01:33 AM    CALCIUM 8.5 04/02/2025 01:33 AM    BILITOT 0.2 04/02/2025 01:33 AM    ALKPHOS 73 04/02/2025 01:33 AM    ALKPHOS 71 05/06/2021 05:07 PM    AST 10 04/02/2025 01:33 AM    ALT 15 04/02/2025 01:33 AM       POC Tests: No results for input(s): \"POCGLU\", \"POCNA\", \"POCK\", \"POCCL\", \"POCBUN\", \"POCHEMO\", \"POCHCT\" in the last 72 hours.    Coags: No results found for: \"PROTIME\", \"INR\", \"APTT\"    HCG (If Applicable): No results found for: \"PREGTESTUR\", \"PREGSERUM\", \"HCG\", \"HCGQUANT\"     ABGs: No results found for: \"PHART\", \"PO2ART\", \"GES3QWL\", \"AIZ2YGZ\", \"BEART\", \"V1IVBOTT\"     Type & Screen (If Applicable):  Lab Results   Component Value Date    ABORH O POSITIVE 04/02/2025    LABANTI NEG 04/02/2025       Drug/Infectious Status (If Applicable):  No results found for: \"HIV\", \"HEPCAB\"    COVID-19 Screening (If Applicable):   Lab Results   Component Value Date/Time    COVID19 Please find results under separate order 05/06/2021 06:16 PM    COVID19 Not detected 05/06/2021 06:16 PM           Anesthesia Evaluation  Patient summary reviewed and Nursing notes reviewed  Airway: Mallampati: II  TM distance: >3 FB   Neck ROM: full  Mouth opening: > = 3 FB   Dental: normal exam         Pulmonary:Negative Pulmonary ROS breath sounds clear to auscultation                             Cardiovascular:Negative CV ROS  Exercise tolerance: good (>4 METS)          Rhythm: regular  Rate: normal                    Neuro/Psych:   Negative Neuro/Psych ROS              GI/Hepatic/Renal:   (+) morbid obesity          Endo/Other: Negative Endo/Other ROS                    Abdominal:             Vascular:

## 2025-04-02 NOTE — PROGRESS NOTES
0401: This RN calling 911 to notify of request to cancel EMS transfer service per Gold WYNNE and Supervisor RN.  verbalizing understanding and canceling service.     0402: EMS arrived to unit and advised by supervisor RN that transfer has been canceled.     0406: Supervisor RN leaving unit at this time and to return when critical transport has arrived to unit.

## 2025-04-02 NOTE — PROGRESS NOTES
The patient exhibited continued lower abdominal pain and toco now began to trace contractions Q5min. Cervix rechecked and was now changed to long/1cm, -3 , which suggested  labor instead of uterine irritability. Will therefore begin MGSO4 for neuroprotection, IV zithromax/clindamycin, betamethasone and will attempt to transfer to [facility] for maternal transfer. FHR is CAT I and VS stable.

## 2025-04-02 NOTE — PROGRESS NOTES
The patient has now suddenly experienced PROM at about 1:32AM which was clear fluid. Dr. Malcolm did a speculum exam which revealed gross clear amniotic fluid in vault. Nitrazine was positive.Cervix appeared long and still 1cm. An US was performed which revealed fetus in vertex presentation with significant amniotic fluid pockets still present. FHR was CAT I.     was then contacted and Dr. Malcolm was connected with Dr. Maria R Reid at [facility]. After detailing the patient's history and clinical course, Dr. Reid agreed to accept patient for maternal transfer since [place] [facility] has a 28 weeks cutoff for the NICU. Pt  consented for EMTALA transfer and all preparations were initiated while transport team was enroute.

## 2025-04-02 NOTE — L&D DELIVERY NOTE
George Rondon [[id number]]      Uncomplicated   after pushing over three contractions after PPROM and PTL at 27 weeks 1 day.  Large gush of blood noted immediately after delivery indicating a possible placental abruption.  Mouth was suctioned.  Delayed cord clamping performed at the direction of the NICU (timed 30 seconds).   was passed directly to the NICU team.  Cord segment was obtained for the NICU, then cord blood obtained.  The placenta delivered intact with large blood clots.  There were no perineal lacerations.  Uterus was firm and bleeding appropriate.     Labor Events     Labor: Yes   Steroids: Partial Course  Cervical Ripening Date/Time:      Antibiotics Received during Labor: Yes  Rupture Date/Time:  25 01:32:00   Rupture Type: SROM, Intact  Fluid Color: Clear  Fluid Odor: None  Fluid Volume: Moderate  Augmentation: None  Labor Complications:  Labor       Anesthesia    Method: Epidural       Labor Event Times      Labor onset date/time:  25 20:00:00     Dilation complete date/time:  25 08:10:00 EDT     Start pushing date/time:  2025 08:14:00   Decision date/time (emergent ):            Labor Length    1st stage: 12h 10m  2nd stage: 0h 11m  3rd stage: 0h 13m       Delivery Details      Delivery Date: 25 Delivery Time: 08:21:00   Delivery Type: Vaginal, Spontaneous              Cameron Presentation    Presentation: Vertex       Shoulder Dystocia    Shoulder Dystocia Present?: No       Assisted Delivery Details    Forceps Attempted?: No  Vacuum Extractor Attempted?: No                           Cord    Vessels: 3 Vessels  Complications: None  Delayed Cord Clamping?: Yes  Cord Clamped Date/Time: 2025 08:23:02  Cord Blood Disposition: Lab  Gases Sent?: No              Placenta    Date/Time: 2025 08:34:57  Removal: Spontaneous  Appearance: Intact  Disposition: Pathology       Lacerations    Episiotomy: None  Perineal Lacerations:

## 2025-04-02 NOTE — LACTATION NOTE
Initial Lactation consultation - patient delivered vaginally at 27 weeks gestation.     Infant admitted to NICU.  Pt will successfully establish breast milk supply by pumping with a hospital grade pump every 2-3 hours for approximately 20 minutes/8-10 x day with the correct size flange, and suction level for mother's comfort.  To maximize milk production, mom taught to incorporate breast massage and hand expression into pumping sessions.  All expressed breast milk (EBM) will be provided for infant use, in clean bottles/syringes for storage in NICU breastmilk refrigerator. Patient label with barcode,date and time applied to each container prior to transport to NICU. Proper cleaning of pump parts and good hand hygiene discussed. Mother is advised to rent a hospital grade pump to continue regimen at home. Progress of milk transition, pumping log, expected EBM volumes, care of engorged breasts discussed.The value of bonding with baby emphasized, strategies for participating in infant care, photos, footprints, touch, and holding skin to skin as soon as baby and mother are able have been shown to increase oxytocin levels.  The breast will be offered as baby is ready; with the goal of eventual transition to breastfeeding.

## 2025-04-02 NOTE — PROGRESS NOTES
2100: Patient presents to labor and delivery due to contractions q4 min. Patient states she is having yellow discharge and urinary frequency. Patient endorses positive fetal movement. Patient states that she has been seen here recently and was diagnosed with a UTI. Patient states that she has been taking her medication for UTI.     2114: Dr. Malcolm bedside for patient evaluation and introduction. SVE performed pt closed/thick/high per Dr. Malcolm.Dr. Malcolm states that it appears that patient has a yeast infection. Orders received to send repeat UA and give patient an IVFB and IV pain medication if needed.     2135: Call to Dr. Malcolm as pt is ctx q 4 min, ctx not showing up on monitor however palpated by this RN, Orders received to adjust toco and administer IV pain medication.     0041: Dr. Malcolm bedside to assess patient status. Patient still uncomfortably codey. Patient tearful and feels like nothing is working.     0105: This RN expressed concern regarding patient continuing to contract despite terb, IVFB and gent being given. Orders received for IV pain medication, due to patient still codey, repeat SVE requested from this RN to Dr Malcolm prior to giving IV pain medications.     0117: Dr. Malcolm bedside, SVE performed by Dr. Malcolm, SVE 1cm. Orders received for mag/beta/antibiotics. Awaiting order verification. MD stated he would call [facility] to discuss transferring patient to higher level of care.     0132: Patient called out reporting feeling like her water broke. Dr. Malcolm  called.     0135:Dr. Malcolm bedside he performed a speculum exam. Patient grossly ruptured.     0238: Call to [facility]'s labor and delivery, telephone SBAR given regarding patient status, medications, labs, FHR and ctx pattern. Awaiting transport team at this time. [facility] NICU team aware of patients status.     0248: Call from transport services, transport unavailable until 0500. Call to housing supervisor to see if there is

## 2025-04-02 NOTE — ANESTHESIA POSTPROCEDURE EVALUATION
Post-Anesthesia Evaluation and Assessment    Patient: Kassidy Rondon MRN: [id number]  SSN: [id number]    YOB: 1995  Age: 29 y.o.  Sex: female      Patient is status post Labor Epidural.     Cardiovascular Function/Vital Signs  Visit Vitals  BP (!) 123/59   Pulse (!) 104   Temp 98 °F (36.7 °C) (Oral)   Resp 18   SpO2 100%       Complications related to anesthesia: None    Post-anesthesia assessment completed. No concerns    Signed By: Dick Denis MD     April 2, 2025

## 2025-04-02 NOTE — ED NOTES
AntePartum High Risk Pregnancy Note    Kassidy Rondon  27w1d    HPI: This is 30 y/o F  at 27w1d , previous , Estimated Date of Delivery: 25 states complains of sharp lower abdominal pain and urinary frequency. She said the pains feel like contractions but denies PROM, vaginal bleeding, nausea or vomiting. Significant to her HPI is the fact that she was treated for a severe cystitis 3 days ago. Pt suffers from chronic anxiety disorder but has not maintained her Zoloft since becoming pregnant. Prenatal care with [facility].    Vitals:  Patient Vitals for the past 24 hrs:   BP Pulse Resp SpO2 Height Weight   25 0004 130/69 90 18 99 % -- --   25 2343 -- -- -- -- 1.676 m (5' 6\") (!) 156.5 kg (345 lb)   25 2245 -- -- 16 -- -- --   25 2115 (!) 141/75 -- 16 -- -- --     No data recorded.    I&O:   No intake/output data recorded.             No intake/output data recorded.    Exam:  Patient with distress.               Abdomen soft, non-tender               Fundus soft and non tender               Right upper quadrant non-tender               Perineum No sign of blood or amniotic fluid               Lower extremities edema 1+                   Dilation: 0 cm     Effacement: Long  Not Checked    Station: Floating     Uterine Activity: irritability               NST:  Reactive           Labs:   Recent Results (from the past 24 hours)   Urinalysis with Reflex to Culture    Collection Time: 25  9:46 PM    Specimen: Urine   Result Value Ref Range    Color, UA YELLOW/STRAW      Appearance CLEAR CLEAR      Specific Gravity, UA 1.011 1.003 - 1.030      pH, Urine 7.0 5.0 - 8.0      Protein, UA Negative NEG mg/dL    Glucose, Ur Negative NEG mg/dL    Ketones, Urine Negative NEG mg/dL    Bilirubin, Urine Negative NEG      Blood, Urine Negative NEG      Urobilinogen, Urine 0.2 0.2 - 1.0 EU/dL    Nitrite, Urine Negative NEG      Leukocyte Esterase, Urine LARGE (A) NEG

## 2025-04-02 NOTE — PROGRESS NOTES
07- Bedside and Verbal shift change report given to LANEY Chambers RN (oncoming nurse) by ZACK Sutherland RN (offgoing nurse). Report included the following information Nurse Handoff Report, MAR, and Recent Results.     814- Dr Butler and NICU at bedside for delivery    821-  of live  male by Dr Butler

## 2025-04-02 NOTE — H&P
Penicillins and Sulfa antibiotics    REVIEW OF SYSTEMS:    As per HPI    PHYSICAL EXAM:    There were no vitals filed for this visit.     General appearance:  pt in obvious pain with contractions  Lungs:  normal respiratory effort  Heart:  regular rate and rhythm   Abdomen:  obese with a pannus  Fetal heart rate:  Baseline Heart Rate 130, accelerations:  present  long term variability:  moderate  decelerations:  absent    Cervix: 3/50/-3, grossly ruptured    Contraction frequency:  not tracing due to obesity but about every 3-4 minutes    Bedside ultrasound - no fluid and cephalic    ASSESSMENT AND PLAN:    The patient is a 29 y.o.  @ 27w1d in PTL and PPROM    I explained that she is likely laboring and I'm not optimistic that she'll stay pregnant long    Received dose of BMTZ at [facility] and azithro/clinda from PPROM, I switched her to vanc    Initially she wanted a  because she had a friend who had a uterine rupture but after discussion including risks she is ok with TOLAC    She is in a lot of pain so is getting epidural    Continue neuromag    NICU consulted

## 2025-04-02 NOTE — PROGRESS NOTES
QBL from delivery was 2100mL, which was predominantly largely clots passed immediately after delivery of the fetus and again with delivery of the placenta.  Placental abruption is likely.  Placenta was sent for pathology.  Vitals stable, patient is asymptomatic.  Plan CBC in AM.

## 2025-04-02 NOTE — PROGRESS NOTES
0417: [facility]  transport team, [id number], arrived to unit to transport pt to [facility], this RN assisting Primary RN.    [place] flowsheet completed with Gold WYNNE MD aware of transport team arrival.     0422: Nursing supervisor  notified of transport team arrival.    [facility] transportation worksheet completed and handed over to transport EMT.

## 2025-04-03 LAB
BACTERIA SPEC CULT: NORMAL
BASOPHILS # BLD: 0.04 K/UL (ref 0–0.1)
BASOPHILS NFR BLD: 0.2 % (ref 0–1)
CC UR VC: NORMAL
DIFFERENTIAL METHOD BLD: ABNORMAL
EOSINOPHIL # BLD: 0.09 K/UL (ref 0–0.4)
EOSINOPHIL NFR BLD: 0.6 % (ref 0–7)
ERYTHROCYTE [DISTWIDTH] IN BLOOD BY AUTOMATED COUNT: 14.2 % (ref 11.5–14.5)
HCT VFR BLD AUTO: 23.7 % (ref 35–47)
HGB BLD-MCNC: 7.7 G/DL (ref 11.5–16)
IMM GRANULOCYTES # BLD AUTO: 0.15 K/UL (ref 0–0.04)
IMM GRANULOCYTES NFR BLD AUTO: 0.9 % (ref 0–0.5)
LYMPHOCYTES # BLD: 3.24 K/UL (ref 0.8–3.5)
LYMPHOCYTES NFR BLD: 19.9 % (ref 12–49)
MCH RBC QN AUTO: 28.3 PG (ref 26–34)
MCHC RBC AUTO-ENTMCNC: 32.5 G/DL (ref 30–36.5)
MCV RBC AUTO: 87.1 FL (ref 80–99)
MONOCYTES # BLD: 0.78 K/UL (ref 0–1)
MONOCYTES NFR BLD: 4.8 % (ref 5–13)
NEUTS SEG # BLD: 12.01 K/UL (ref 1.8–8)
NEUTS SEG NFR BLD: 73.6 % (ref 32–75)
NRBC # BLD: 0 K/UL (ref 0–0.01)
NRBC BLD-RTO: 0 PER 100 WBC
PLATELET # BLD AUTO: 260 K/UL (ref 150–400)
PMV BLD AUTO: 11 FL (ref 8.9–12.9)
RBC # BLD AUTO: 2.72 M/UL (ref 3.8–5.2)
SERVICE CMNT-IMP: NORMAL
WBC # BLD AUTO: 16.3 K/UL (ref 3.6–11)

## 2025-04-03 PROCEDURE — 85025 COMPLETE CBC W/AUTO DIFF WBC: CPT

## 2025-04-03 PROCEDURE — 6370000000 HC RX 637 (ALT 250 FOR IP): Performed by: STUDENT IN AN ORGANIZED HEALTH CARE EDUCATION/TRAINING PROGRAM

## 2025-04-03 PROCEDURE — 6370000000 HC RX 637 (ALT 250 FOR IP): Performed by: OBSTETRICS & GYNECOLOGY

## 2025-04-03 PROCEDURE — 1120000000 HC RM PRIVATE OB

## 2025-04-03 PROCEDURE — 2500000003 HC RX 250 WO HCPCS: Performed by: OBSTETRICS & GYNECOLOGY

## 2025-04-03 RX ORDER — FERROUS SULFATE 325(65) MG
325 TABLET ORAL
Status: DISCONTINUED | OUTPATIENT
Start: 2025-04-03 | End: 2025-04-04 | Stop reason: HOSPADM

## 2025-04-03 RX ORDER — FLUOXETINE 20 MG/5ML
20 SOLUTION ORAL DAILY
Status: DISCONTINUED | OUTPATIENT
Start: 2025-04-03 | End: 2025-04-03

## 2025-04-03 RX ADMIN — FERROUS SULFATE TAB 325 MG (65 MG ELEMENTAL FE) 325 MG: 325 (65 FE) TAB at 15:09

## 2025-04-03 RX ADMIN — IBUPROFEN 800 MG: 400 TABLET, FILM COATED ORAL at 08:50

## 2025-04-03 RX ADMIN — SODIUM CHLORIDE, PRESERVATIVE FREE 10 ML: 5 INJECTION INTRAVENOUS at 20:32

## 2025-04-03 RX ADMIN — SODIUM CHLORIDE, PRESERVATIVE FREE 10 ML: 5 INJECTION INTRAVENOUS at 08:50

## 2025-04-03 RX ADMIN — DOCUSATE SODIUM 100 MG: 100 CAPSULE, LIQUID FILLED ORAL at 20:31

## 2025-04-03 RX ADMIN — ACETAMINOPHEN 1000 MG: 500 TABLET ORAL at 12:20

## 2025-04-03 RX ADMIN — DOCUSATE SODIUM 100 MG: 100 CAPSULE, LIQUID FILLED ORAL at 08:50

## 2025-04-03 RX ADMIN — ACETAMINOPHEN 1000 MG: 500 TABLET ORAL at 20:31

## 2025-04-03 RX ADMIN — ACETAMINOPHEN 1000 MG: 500 TABLET ORAL at 04:21

## 2025-04-03 RX ADMIN — IBUPROFEN 800 MG: 400 TABLET, FILM COATED ORAL at 00:57

## 2025-04-03 RX ADMIN — FLUOXETINE HYDROCHLORIDE 20 MG: 20 CAPSULE ORAL at 15:09

## 2025-04-03 RX ADMIN — IBUPROFEN 800 MG: 400 TABLET, FILM COATED ORAL at 15:09

## 2025-04-03 ASSESSMENT — PAIN DESCRIPTION - LOCATION
LOCATION: ABDOMEN
LOCATION: HEAD
LOCATION: ABDOMEN

## 2025-04-03 ASSESSMENT — PAIN SCALES - GENERAL
PAINLEVEL_OUTOF10: 4
PAINLEVEL_OUTOF10: 3

## 2025-04-03 ASSESSMENT — PAIN DESCRIPTION - ORIENTATION
ORIENTATION: POSTERIOR
ORIENTATION: ANTERIOR;LOWER
ORIENTATION: ANTERIOR;LOWER

## 2025-04-03 ASSESSMENT — PAIN - FUNCTIONAL ASSESSMENT: PAIN_FUNCTIONAL_ASSESSMENT: ACTIVITIES ARE NOT PREVENTED

## 2025-04-03 ASSESSMENT — PAIN DESCRIPTION - DESCRIPTORS
DESCRIPTORS: ACHING
DESCRIPTORS: ACHING;CRAMPING
DESCRIPTORS: CRAMPING

## 2025-04-03 NOTE — PROGRESS NOTES
Post-Partum Day Number 1 Progress Note    Kassidy Rondon     Assessment: PPD1 sp  at 27w1d with suspected placental abruption     Acute blood loss anemia-  Hgb 10.9 > 7.7, VSS, asymptomatic. PO iron ordered.      At risk for postpartum mood disorder- notes history of anxiety for which she was prev on fluoxetine 20mg daily. Would like to restart, ordered. Plan 2wk mood check.     Male infant in NICU     Plan:  - Continue routine postpartum and perineal care as well as maternal education.  - Plan discharge home tomorrow.    Information for the patient's :  George Rondon [[id number]]   Vaginal, Spontaneous Patient physically doing okay, but feeling emotional and tearful about baby being in the NICU. Voiding without difficulty, normal lochia.    Vitals:  /61   Pulse 77   Temp 98.2 °F (36.8 °C)   Resp 16   SpO2 98%   Breastfeeding Unknown   Temp (24hrs), Av.2 °F (36.8 °C), Min:97.3 °F (36.3 °C), Max:99 °F (37.2 °C)        Exam:   Patient without distress.                  Fundus firm, nontender per nursing fundal checks.                Perineum with normal lochia noted per nursing assessment.                Lower extremities are negative for pathological edema.    Labs:     Lab Results   Component Value Date/Time    WBC 16.3 2025 04:30 AM    WBC 16.2 2025 01:33 AM    WBC 16.0 2021 04:03 AM    WBC 14.4 2021 05:07 PM    HGB 7.7 2025 04:30 AM    HGB 10.9 2025 01:33 AM    HGB 8.8 2021 04:03 AM    HGB 11.6 2021 05:07 PM    HCT 23.7 2025 04:30 AM    HCT 33.7 2025 01:33 AM    HCT 28.7 2021 04:03 AM    HCT 35.8 2021 05:07 PM     2025 04:30 AM     2025 01:33 AM     2021 04:03 AM     2021 05:07 PM       Recent Results (from the past 24 hours)   CBC with Auto Differential    Collection Time: 25  4:30 AM   Result Value Ref Range    WBC 16.3 (H) 3.6 - 11.0 K/uL    RBC 2.72 (L)  3.80 - 5.20 M/uL    Hemoglobin 7.7 (L) 11.5 - 16.0 g/dL    Hematocrit 23.7 (L) 35.0 - 47.0 %    MCV 87.1 80.0 - 99.0 FL    MCH 28.3 26.0 - 34.0 PG    MCHC 32.5 30.0 - 36.5 g/dL    RDW 14.2 11.5 - 14.5 %    Platelets 260 150 - 400 K/uL    MPV 11.0 8.9 - 12.9 FL    Nucleated RBCs 0.0 0  WBC    nRBC 0.00 0.00 - 0.01 K/uL    Neutrophils % 73.6 32.0 - 75.0 %    Lymphocytes % 19.9 12.0 - 49.0 %    Monocytes % 4.8 (L) 5.0 - 13.0 %    Eosinophils % 0.6 0.0 - 7.0 %    Basophils % 0.2 0.0 - 1.0 %    Immature Granulocytes % 0.9 (H) 0.0 - 0.5 %    Neutrophils Absolute 12.01 (H) 1.80 - 8.00 K/UL    Lymphocytes Absolute 3.24 0.80 - 3.50 K/UL    Monocytes Absolute 0.78 0.00 - 1.00 K/UL    Eosinophils Absolute 0.09 0.00 - 0.40 K/UL    Basophils Absolute 0.04 0.00 - 0.10 K/UL    Immature Granulocytes Absolute 0.15 (H) 0.00 - 0.04 K/UL    Differential Type AUTOMATED

## 2025-04-03 NOTE — PROGRESS NOTES
Verbal shift change report given to JACKI Solorzano RN (oncoming nurse) by TERESA Dowling RN & KEAGAN Lackey RN (offgoing nurse). Report included the following information Nurse Handoff Report, Index, Adult Overview, Intake/Output, MAR, Recent Results, and Med Rec Status.

## 2025-04-03 NOTE — CARE COORDINATION
Care Management Initial Assessment       RUR: 6%--low  Readmission? Yes - transfer from [facility]  1st IM letter given? No  1st  letter given: No    Emergency contact: Walter Rondon, spouse, [phone]    Patient transferred from [facility] for premature rupture of membranes at 27w GA. Higher level NICU needed for infant after birth. CM provided breast pump to mother for use at home. CM will follow family in NICU.     04/03/25 7854   Service Assessment   Patient Orientation Alert and Oriented   Cognition Alert   History Provided By Patient   Primary Caregiver Self   Accompanied By/Relationship spouse   Support Systems Spouse/Significant Other;Family Members   PCP Verified by CM No   Prior Functional Level Independent in ADLs/IADLs   Current Functional Level Independent in ADLs/IADLs   Can patient return to prior living arrangement Yes   Ability to make needs known: Good   Family able to assist with home care needs: Yes   Financial Resources None   Community Resources None   CM/SW Referral DME;Other (see comment)  (breast pump and NICU parent)   Social/Functional History   Lives With Spouse   Type of Home House   Prior Level of Assist for ADLs Independent   Prior Level of Assist for Homemaking Independent   Homemaking Responsibilities Yes   Ambulation Assistance Independent   Prior Level of Assist for Transfers Independent   Active  Yes   Occupation Unemployed   Discharge Planning   Type of Residence House   Living Arrangements Spouse/Significant Other;Children   Current Services Prior To Admission None   Potential Assistance Needed N/A   DME Ordered? Other (comment)  (breast pump)   Potential Assistance Purchasing Medications No   Type of Home Care Services None   Patient expects to be discharged to: House   Services At/After Discharge   Transition of Care Consult (CM Consult) N/A   Services At/After Discharge None   Mode of Transport at Discharge Other (see comment)  (in car w/spouse)   Confirm Follow Up

## 2025-04-03 NOTE — LACTATION NOTE
Mother continues to pump for baby in NICU.  She has been able to collect small amounts of colostrum for baby.  Mother has no further questions for lactation consultant at this time.

## 2025-04-03 NOTE — CARE COORDINATION
Transition of Care Plan:    RUR: 6%--low  Prior Level of Functioning: independent  Disposition: home  Follow up appointments: OB/GYN  DME needed: N/A  Transportation at discharge: in car w/spouse  IM/IMM Medicare/ letter given: 4/3/25  Is patient a  and connected with [facility]? no   If yes, was  transfer form completed and [facility] notified?   Caregiver Contact: Walter Rondon, spouse, [phone]  Discharge Caregiver contacted prior to discharge? N/A  Care Conference needed? no  Barriers to discharge:  none noted     04/03/25 1654   Readmission Assessment   Number of Days since last admission? 1-7 days  (transfer from [facility])   Previous Disposition Other (comment)  (transfer)   Who is being Interviewed Patient   What was the patient's/caregiver's perception as to why they think they needed to return back to the hospital? Other (Comment)  (transfer)   Did you visit your Primary Care Physician after you left the hospital, before you returned this time? No   Why weren't you able to visit your PCP? Did not have an appointment   Did you see a specialist, such as Cardiac, Pulmonary, Orthopedic Physician, etc. after you left the hospital? No   Who advised the patient to return to the hospital? Physician   Does the patient report anything that got in the way of taking their medications? No   In our efforts to provide the best possible care to you and others like you, can you think of anything that we could have done to help you after you left the hospital the first time, so that you might not have needed to return so soon? Other (Comment)  (transfer for higher level NICU)     Renée Mcnulty LMSW  Care Management [facility]  [phone]

## 2025-04-03 NOTE — PROGRESS NOTES
740- Bedside and Verbal shift change report given to Elvira SANTILLAN RN (oncoming nurse) by Francisca RAUSCH RN (offgoing nurse). Report included the following information Nurse Handoff Report, Index, Adult Overview, Intake/Output, MAR, Recent Results, and Med Rec Status.

## 2025-04-03 NOTE — PROGRESS NOTES
Spiritual Care Partner Volunteer visited patient at [facility] in [facility] on 4/3/2025   Documented by:  Jose Narvaez MDIV, [facility]

## 2025-04-04 VITALS
TEMPERATURE: 97.9 F | SYSTOLIC BLOOD PRESSURE: 141 MMHG | HEART RATE: 79 BPM | DIASTOLIC BLOOD PRESSURE: 76 MMHG | RESPIRATION RATE: 18 BRPM | OXYGEN SATURATION: 97 %

## 2025-04-04 PROCEDURE — 6360000002 HC RX W HCPCS: Performed by: OBSTETRICS & GYNECOLOGY

## 2025-04-04 PROCEDURE — 6370000000 HC RX 637 (ALT 250 FOR IP): Performed by: OBSTETRICS & GYNECOLOGY

## 2025-04-04 PROCEDURE — 2500000003 HC RX 250 WO HCPCS: Performed by: OBSTETRICS & GYNECOLOGY

## 2025-04-04 PROCEDURE — 6370000000 HC RX 637 (ALT 250 FOR IP): Performed by: STUDENT IN AN ORGANIZED HEALTH CARE EDUCATION/TRAINING PROGRAM

## 2025-04-04 PROCEDURE — 90715 TDAP VACCINE 7 YRS/> IM: CPT | Performed by: OBSTETRICS & GYNECOLOGY

## 2025-04-04 PROCEDURE — 90471 IMMUNIZATION ADMIN: CPT | Performed by: OBSTETRICS & GYNECOLOGY

## 2025-04-04 PROCEDURE — 3E0234Z INTRODUCTION OF SERUM, TOXOID AND VACCINE INTO MUSCLE, PERCUTANEOUS APPROACH: ICD-10-PCS | Performed by: OBSTETRICS & GYNECOLOGY

## 2025-04-04 RX ORDER — IBUPROFEN 800 MG/1
800 TABLET, FILM COATED ORAL EVERY 8 HOURS PRN
Qty: 30 TABLET | Refills: 1 | Status: SHIPPED | OUTPATIENT
Start: 2025-04-04

## 2025-04-04 RX ORDER — FERROUS SULFATE 325(65) MG
325 TABLET ORAL
Status: SHIPPED | COMMUNITY
Start: 2025-04-05

## 2025-04-04 RX ADMIN — IBUPROFEN 800 MG: 400 TABLET, FILM COATED ORAL at 08:46

## 2025-04-04 RX ADMIN — FERROUS SULFATE TAB 325 MG (65 MG ELEMENTAL FE) 325 MG: 325 (65 FE) TAB at 08:46

## 2025-04-04 RX ADMIN — FLUOXETINE HYDROCHLORIDE 20 MG: 20 CAPSULE ORAL at 08:46

## 2025-04-04 RX ADMIN — TETANUS TOXOID, REDUCED DIPHTHERIA TOXOID AND ACELLULAR PERTUSSIS VACCINE, ADSORBED 0.5 ML: 5; 2.5; 8; 8; 2.5 SUSPENSION INTRAMUSCULAR at 13:35

## 2025-04-04 RX ADMIN — DOCUSATE SODIUM 100 MG: 100 CAPSULE, LIQUID FILLED ORAL at 08:46

## 2025-04-04 RX ADMIN — SODIUM CHLORIDE, PRESERVATIVE FREE 10 ML: 5 INJECTION INTRAVENOUS at 08:47

## 2025-04-04 RX ADMIN — IBUPROFEN 800 MG: 400 TABLET, FILM COATED ORAL at 04:19

## 2025-04-04 RX ADMIN — ACETAMINOPHEN 1000 MG: 500 TABLET ORAL at 04:19

## 2025-04-04 ASSESSMENT — PAIN DESCRIPTION - DESCRIPTORS: DESCRIPTORS: CRAMPING

## 2025-04-04 ASSESSMENT — PAIN DESCRIPTION - ORIENTATION: ORIENTATION: LOWER

## 2025-04-04 ASSESSMENT — PAIN SCALES - GENERAL
PAINLEVEL_OUTOF10: 2
PAINLEVEL_OUTOF10: 4

## 2025-04-04 ASSESSMENT — PAIN - FUNCTIONAL ASSESSMENT: PAIN_FUNCTIONAL_ASSESSMENT: ACTIVITIES ARE NOT PREVENTED

## 2025-04-04 ASSESSMENT — PAIN DESCRIPTION - LOCATION: LOCATION: ABDOMEN

## 2025-04-04 NOTE — DISCHARGE INSTRUCTIONS
Vaginal Childbirth: Care Instructions  Overview     Vaginal birth means delivering a baby through the birth canal (vagina). During labor, the uterus tightens (contracts) regularly to thin and open the cervix and to push the baby out through the birth canal.  Your body will slowly heal in the next few weeks. It's easy to get too tired and overwhelmed during the first weeks after your baby is born. Changes in your hormones can shift your mood without warning. You may find it hard to meet the extra demands on your energy and time. Take it easy on yourself.  Follow-up care is a key part of your treatment and safety. Be sure to make and go to all appointments, and call your doctor if you are having problems. It's also a good idea to know your test results and keep a list of the medicines you take.  How can you care for yourself at home?  Vaginal bleeding and cramps  After delivery, you will have a bloody discharge from your vagina. This will turn pink within a week and then white or yellow after about 10 days. It may last for 2 to 4 weeks or longer, until the uterus has healed. Use sanitary pads until you stop bleeding. Using pads makes it easier to monitor your bleeding.  Don't worry if you pass some blood clots, as long as they are smaller than a golf ball. If you have a tear or stitches in your vaginal area, change the pad at least every 4 hours. This will help prevent soreness and infection.  You may have cramps for the first few days after childbirth. These are normal and occur as the uterus shrinks to normal size. Take an over-the-counter pain medicine, such as acetaminophen (Tylenol), ibuprofen (Advil, Motrin), or naproxen (Aleve), for cramps. Read and follow all instructions on the label. Do not take aspirin, because it can cause more bleeding.  Do not take two or more pain medicines at the same time unless the doctor told you to. Many pain medicines have acetaminophen, which is Tylenol. Too much  your use of this information.

## 2025-04-04 NOTE — PROGRESS NOTES
Bedside, Verbal, and Written shift change report given to Derrick RN (oncoming nurse) by Elvira RN (offgoing nurse). Report included the following information Nurse Handoff Report.

## 2025-04-04 NOTE — PLAN OF CARE
infection during hospitalization  Outcome: Progressing  Goal: Absence of fever/infection during anticipated neutropenic period  Outcome: Progressing     Problem: Discharge Planning  Goal: Discharge to home or other facility with appropriate resources  Outcome: Progressing     Problem: Chronic Conditions and Co-morbidities  Goal: Patient's chronic conditions and co-morbidity symptoms are monitored and maintained or improved  Outcome: Progressing

## 2025-04-04 NOTE — PROGRESS NOTES
Post-Partum Day Number 2 Progress Note    Kassidy Rondon       Assessment: PPD2 sp  at 27w1d with suspected placental abruption      Acute blood loss anemia-  Hgb 10.9 > 7.7, VSS, asymptomatic. PO iron ordered -- plan to continue at home.       At risk for postpartum mood disorder- notes history of anxiety for which she was prev on fluoxetine 20mg daily. Started yesterday Plan 2wk mood check.      Male infant in NICU     Plan:   - Discharge home today  - Follow up in office in 2 week(s) with [facility] [place].  - Pain medication prescription(s) sent.  - Questions answered.    Information for the patient's :  George Rondon [[id number]]   Vaginal, Spontaneous Patient doing well without significant complaint.  Voiding without difficulty, normal lochia. Ready for discharge home. Mood doing well this AM.  Baby making good progress in NICU.      Vitals:  BP (!) 141/76   Pulse 79   Temp 97.9 °F (36.6 °C) (Oral)   Resp 18   SpO2 97%   Breastfeeding Unknown   Temp (24hrs), Av.1 °F (36.7 °C), Min:97.6 °F (36.4 °C), Max:98.8 °F (37.1 °C)      Exam:        Patient without distress.                Fundus firm, nontender per nursing fundal checks                Perineum with normal lochia noted per nursing assessment                Lower extremities are negative for pathological edema    Labs:     Lab Results   Component Value Date/Time    WBC 16.3 2025 04:30 AM    WBC 16.2 2025 01:33 AM    WBC 16.0 2021 04:03 AM    WBC 14.4 2021 05:07 PM    HGB 7.7 2025 04:30 AM    HGB 10.9 2025 01:33 AM    HGB 8.8 2021 04:03 AM    HGB 11.6 2021 05:07 PM    HCT 23.7 2025 04:30 AM    HCT 33.7 2025 01:33 AM    HCT 28.7 2021 04:03 AM    HCT 35.8 2021 05:07 PM     2025 04:30 AM     2025 01:33 AM     2021 04:03 AM     2021 05:07 PM       No results found for this or any previous visit (from the past

## 2025-04-04 NOTE — LACTATION NOTE
Patient continues to pump for baby in the NICU. She said she is collecting a small amount of colostrum each time she pumps. She knows to label the milk with the date and time before taking the milk to the nicu for the baby.

## 2025-04-04 NOTE — DISCHARGE SUMMARY
Obstetrical Discharge Summary     Name: Kassidy Rondon MRN: [id number]  SSN: [id number]    YOB: 1995  Age: 29 y.o.  Sex: female      Admit Date: 2025    Discharge Date: 2025     Admitting Physician: Maria R Reid MD     Attending Physician:  Uma Butler MD     Admission Diagnoses: 27 weeks gestation of pregnancy [Z3A.27]    Discharge Diagnoses:   Information for the patient's :  George Rondon [[id number]]   @[id number]@     Additional Diagnoses:  No components found for: \"OBEXTABORH\", \"OBEXTABSCRN\", \"OBEXTRUBELLA\", \"OBEXTGRBS\"    Hospital Course: Pt presented in active labor at 27w1d with suspected abruption.  She proceeded to .  Postpartum course was unremarkable.      Patient Instructions:   Current Discharge Medication List        START taking these medications    Details   FLUoxetine (PROZAC) 20 MG capsule Take 1 capsule by mouth daily  Qty: 90 capsule, Refills: 1      ibuprofen (ADVIL;MOTRIN) 800 MG tablet Take 1 tablet by mouth every 8 hours as needed for Pain  Qty: 30 tablet, Refills: 1      ferrous sulfate (IRON 325) 325 (65 Fe) MG tablet Take 1 tablet by mouth daily (with breakfast)           CONTINUE these medications which have NOT CHANGED    Details   Prenatal Vit-Fe Fumarate-FA (PRENATAL PO) Take by mouth           STOP taking these medications       nitrofurantoin, macrocrystal-monohydrate, (MACROBID) 100 MG capsule Comments:   Reason for Stopping:               Disposition at Discharge: Home or self care    Condition at Discharge: Stable    Reference my discharge instructions.    No follow-ups on file.     Signed By:  Rod Aleman MD     2025

## 2025-04-04 NOTE — PROGRESS NOTES
740- Bedside and Verbal shift change report given to Elvira SANTILLAN RN (oncoming nurse) by ROSALIND Islas RN (offgoing nurse). Report included the following information Nurse Handoff Report, Index, Adult Overview, Intake/Output, MAR, Recent Results, and Med Rec Status.      1315- I have reviewed discharge instruction and reviewed medication times. Patient given copy of discharge instructions. Patient verbalizes understanding and denies any further questions at this time.

## 2025-04-05 LAB
BACTERIA SPEC CULT: NORMAL
SERVICE CMNT-IMP: NORMAL

## (undated) DEVICE — 3000CC GUARDIAN II: Brand: GUARDIAN

## (undated) DEVICE — ROCKER SWITCH PENCIL HOLSTER: Brand: VALLEYLAB

## (undated) DEVICE — ADHESIVE TISS DERMA FLEX 0.7ML -- HIGH VISCOSITY

## (undated) DEVICE — LARGE, DISPOSABLE ALEXIS O C-SECTION PROTECTOR - RETRACTOR: Brand: ALEXIS ® O C-SECTION PROTECTOR - RETRACTOR

## (undated) DEVICE — PREP SKN CHLRAPRP APL 26ML STR --

## (undated) DEVICE — STERILE POLYISOPRENE POWDER-FREE SURGICAL GLOVES: Brand: PROTEXIS

## (undated) DEVICE — STERILE LATEX POWDER-FREE SURGICAL GLOVESWITH NITRILE COATING: Brand: PROTEXIS

## (undated) DEVICE — SPONGE LAP 18X18IN STRL -- 5/PK

## (undated) DEVICE — TIP CLEANER: Brand: VALLEYLAB

## (undated) DEVICE — REM POLYHESIVE ADULT PATIENT RETURN ELECTRODE: Brand: VALLEYLAB

## (undated) DEVICE — HANDLE LT SNAP ON ULT DURABLE LENS FOR TRUMPF ALC DISPOSABLE

## (undated) DEVICE — SOLIDIFIER FLUID 3000 CC ABSORB

## (undated) DEVICE — GARMENT,MEDLINE,DVT,INT,CALF,MED, GEN2: Brand: MEDLINE

## (undated) DEVICE — TOWEL,OR,DSP,ST,BLUE,STD,2/PK,40PK/CS: Brand: MEDLINE

## (undated) DEVICE — SOL IRR STRL H2O 1000ML BTL --

## (undated) DEVICE — STRAP,POSITIONING,KNEE/BODY,FOAM,4X60": Brand: MEDLINE

## (undated) DEVICE — C-SECTION II-LF: Brand: MEDLINE INDUSTRIES, INC.

## (undated) DEVICE — SOL IRR SOD CL 0.9% 1000ML BTL --